# Patient Record
Sex: MALE | Race: WHITE | NOT HISPANIC OR LATINO | Employment: FULL TIME | URBAN - METROPOLITAN AREA
[De-identification: names, ages, dates, MRNs, and addresses within clinical notes are randomized per-mention and may not be internally consistent; named-entity substitution may affect disease eponyms.]

---

## 2018-01-05 ENCOUNTER — APPOINTMENT (EMERGENCY)
Dept: RADIOLOGY | Facility: HOSPITAL | Age: 48
End: 2018-01-05
Payer: OTHER MISCELLANEOUS

## 2018-01-05 ENCOUNTER — HOSPITAL ENCOUNTER (EMERGENCY)
Facility: HOSPITAL | Age: 48
Discharge: HOME | End: 2018-01-05
Attending: EMERGENCY MEDICINE
Payer: OTHER MISCELLANEOUS

## 2018-01-05 VITALS
HEIGHT: 66 IN | TEMPERATURE: 97.6 F | OXYGEN SATURATION: 99 % | BODY MASS INDEX: 24.11 KG/M2 | SYSTOLIC BLOOD PRESSURE: 122 MMHG | HEART RATE: 64 BPM | DIASTOLIC BLOOD PRESSURE: 70 MMHG | WEIGHT: 150 LBS | RESPIRATION RATE: 17 BRPM

## 2018-01-05 DIAGNOSIS — S63.602A SPRAIN OF LEFT THUMB: Primary | ICD-10-CM

## 2018-01-05 DIAGNOSIS — S50.02XA CONTUSION OF LEFT ELBOW: ICD-10-CM

## 2018-01-05 DIAGNOSIS — S43.101A AC SEPARATION, RIGHT, INITIAL ENCOUNTER: ICD-10-CM

## 2018-01-05 PROCEDURE — 73030 X-RAY EXAM OF SHOULDER: CPT

## 2018-01-05 PROCEDURE — 73080 X-RAY EXAM OF ELBOW: CPT

## 2018-01-05 PROCEDURE — 73140 X-RAY EXAM OF FINGER(S): CPT

## 2018-01-05 PROCEDURE — 99283 EMERGENCY DEPT VISIT LOW MDM: CPT

## 2018-01-05 RX ORDER — TRAMADOL HYDROCHLORIDE 50 MG/1
50 TABLET ORAL EVERY 6 HOURS PRN
Qty: 15 TABLET | Refills: 0 | Status: SHIPPED | OUTPATIENT
Start: 2018-01-05 | End: 2018-09-26

## 2018-01-05 RX ORDER — IBUPROFEN 600 MG/1
600 TABLET ORAL ONCE
Status: DISCONTINUED | OUTPATIENT
Start: 2018-01-05 | End: 2018-01-05

## 2018-01-05 NOTE — DISCHARGE INSTRUCTIONS
Rest, ice, elevate  Sling until seen by ortho  Call ortho on Monday for a follow up appointment  Motrin 600mg every 6 hours as needed for pain, ultram for severe pain  No heavy lifting  Acromioclavicular Separation   WHAT YOU NEED TO KNOW:   An acromioclavicular separation (AS), or shoulder separation, is when your shoulder and collarbone move or come apart  An AS is usually caused by an injury, such as falling on your shoulder  The bones move or come apart because the ligaments that hold the bones in place are stretched or torn  DISCHARGE INSTRUCTIONS:   Medicines: You may need any of the following:  · Acetaminophen  decreases pain and is available without a doctor's order  Ask how much to take and how often to take it  Follow directions  Acetaminophen can cause liver damage if not taken correctly  · NSAIDs  help decrease swelling and pain  This medicine is available with or without a doctor's order  NSAIDs can cause stomach bleeding or kidney problems in certain people  If you take blood thinner medicine, always ask your healthcare provider if NSAIDs are safe for you  Always read the medicine label and follow directions  · A Tetanus (Td) vaccine  may be needed if you have an open wound  This vaccine is a booster shot used to help prevent diphtheria and tetanus  · Take your medicine as directed  Contact your healthcare provider if you think your medicine is not helping or if you have side effects  Tell him if you are allergic to any medicine  Keep a list of the medicines, vitamins, and herbs you take  Include the amounts, and when and why you take them  Bring the list or the pill bottles to follow-up visits  Carry your medicine list with you in case of an emergency  Apply ice:  Apply ice on your shoulder for 15 to 20 minutes every hour for the first 1 to 2 days  Use an ice pack, or put crushed ice in a plastic bag  Cover it with a towel   Ice helps prevent tissue damage and decreases swelling and pain  Apply heat:  Apply heat on your shoulder for 20 to 30 minutes every 2 hours after the first 1 to 2 days  Heat helps decrease pain and muscle spasms  Wear your support device: You may need to wear a strap, elastic bandage, or sling  These devices keep your shoulder in the correct position so it can heal   · Wear the strap or sling constantly for 6 to 8 weeks, even when you sleep  You may remove the strap or sling when you bathe  Do not move your shoulder or arm when the strap or sling is off  Do not lift your arm  · The strap or sling must be tightened by another person every day  Tighten it enough to keep your shoulders back in the correct posture  Tell the person to allow enough room to fit an index finger between your body and the strap  Put a folded wash cloth in your armpit to prevent pressure on the nerves by the strap  Loosen the strap if you feel numbness or tingling in your arm or hand  Rest your shoulder:  Rest your shoulder as much as possible to decrease swelling and help it heal    Follow up with your healthcare provider as directed:  Write down your questions so you remember to ask them during your visits  Contact your healthcare provider if:   · You have a fever  · You have worse pain, even after you take medicine  · You have an open wound that is red, swollen, or draining pus  · Your arm or hand becomes numb or tingles  · You have questions or concerns about your condition or care  Return to the emergency department if:   · You lose feeling in your arm or hand  · You cannot move your arm or hand  © 2017 [address] [employer] Information is for End User's use only and may not be sold, redistributed or otherwise used for commercial purposes  All illustrations and images included in CareNotes® are the copyrighted property of A D [employer] M [employer]  or [employer]  The above information is an  only   It is not intended as medical advice for individual conditions or treatments  Talk to your doctor, nurse or pharmacist before following any medical regimen to see if it is safe and effective for you  Contusion in Adults   AMBULATORY CARE:   A contusion  is a bruise that appears on your skin after an injury  A bruise happens when small blood vessels tear but skin does not  When blood vessels tear, blood leaks into nearby tissue, such as soft tissue or muscle  Other signs and symptoms you may have with a contusion:   · Pain that increases when you touch the bruise, walk, or use the area around the bruise    · Swelling or a lump at the site of the bruise or near it    · Red, blue, or black skin that may change to green or yellow after a few days           · Stiffness or problems moving the bruised area of your body  Seek care immediately if:   · You have new trouble moving the injured area  · You have tingling or numbness in or near the injured area  · Your hand or foot below the bruise gets cold or turns pale  Contact your healthcare provider if:   · You find a new lump in the injured area  · Your symptoms do not improve with treatment after 4 to 5 days  · You have questions or concerns about your condition or care  Treatment for a contusion  may include any of the following:  · NSAIDs , such as ibuprofen, help decrease swelling, pain, and fever  This medicine is available with or without a doctor's order  NSAIDs can cause stomach bleeding or kidney problems in certain people  If you take blood thinner medicine, always ask your healthcare provider if NSAIDs are safe for you  Always read the medicine label and follow directions  · Prescription pain medicine  may be given  Do not wait until the pain is severe before you take your medicine  · Aspiration  is a procedure used to drain pooled blood in your muscle  This may help prevent increased pressure in the muscle      · Surgery  may be done to repair a tear in the muscle or relieve pressure in the muscle caused by swelling  Help a contusion heal:   · Rest the injured area  or use it less than usual  If you bruised your leg or foot, you may need crutches or a cane to help you walk  This will help you keep weight off your injured body part  · Apply ice  to decrease swelling and pain  Ice may also help prevent tissue damage  Use an ice pack, or put crushed ice in a plastic bag  Cover it with a towel and place it on your bruise for 15 to 20 minutes every hour or as directed  · Use compression  to support the area and decrease swelling  Wrap an elastic bandage around the area over the bruised muscle  Make sure the bandage is not too tight  You should be able to fit 1 finger between the bandage and your skin  · Elevate (raise) your injured body part  above the level of your heart to help decrease pain and swelling  Use pillows, blankets, or rolled towels to elevate the area as often as you can  · Do not drink alcohol  as directed  Alcohol may slow healing  · Do not stretch injured muscles  right after your injury  Ask your healthcare provider when and how you may safely stretch after your injury  Gentle stretches can help increase your flexibility  · Do not massage the area or put heating pads  on the bruise right after your injury  Heat and massage may slow healing  Your healthcare provider may tell you to apply heat after several days  At that time, heat will start to help the injury heal   Follow up with your healthcare provider as directed:  Write down your questions so you remember to ask them during your visits  © 2017 [address] [employer] Information is for End User's use only and may not be sold, redistributed or otherwise used for commercial purposes  All illustrations and images included in CareNotes® are the copyrighted property of [employer] , [employer]  or Jd Turner  The above information is an  only   It is not intended as medical advice for individual conditions or treatments  Talk to your doctor, nurse or pharmacist before following any medical regimen to see if it is safe and effective for you  Finger Sprain   AMBULATORY CARE:   A finger sprain  happens when ligaments in your finger or thumb are stretched or torn  Ligaments are the tough tissues that connect bones  Ligaments allow your hands to grasp and pinch  Common symptoms include the following:   · Bruising or changes in skin color    · Pain and stiffness     · Swelling and tenderness  Seek immediate care for the following symptoms:   · The skin on your injured finger looks bluish or pale (less color than normal)  · You have new weakness or numbness in your finger or thumb  · You have a splint that you cannot adjust and it feels too tight  Contact your healthcare provider if:   · You have new or increased swelling or pain in your finger  · You have new or increased stiffness when you move your injured finger  · You have questions or concerns about your injury or treatment  Treatment for a finger sprain  may include medicine to decrease pain  Do not wait until the pain is severe before taking your medicine  Care for a finger sprain:   · Rest  your finger for at least 48 hours  Do not do activities that cause pain  Return to normal activities as directed  · Apply ice  on your finger to help decrease pain and swelling  Put crushed ice in a plastic bag and cover it with a towel  Put the ice on your injured finger or thumb every hour for 15 to 20 minutes at a time  You may need to ice the area at least 4 to 8 times each day  Ice your finger for as many days as directed  · Elevate your finger  above the level of your heart as often as you can  This will help decrease swelling and pain  You can elevate your hand by resting it on a pillow  · Use a splint or compression as directed  Compression (tight hold) helps support your finger or thumb as it heals   Tape your injured finger to the finger beside it  Severe sprains may be treated with a splint  A splint prevents your finger from moving while it heals  Ask how long you must wear the splint or tape, and how to apply them  · Do exercises as directed  You may be given gentle exercises to begin in a few days  Exercises can help decrease stiffness in your finger or thumb  Exercises also help decrease pain and swelling and improve the movement of your finger or thumb  Check with your healthcare provider before you return to your normal activities or sports  Follow up with your healthcare provider as directed:  Write down your questions so you remember to ask them during your visits  © 2017 [address] [facility] Information is for End User's use only and may not be sold, redistributed or otherwise used for commercial purposes  All illustrations and images included in CareNotes® are the copyrighted property of A D A M [employer] Inc  or [employer] Turner  The above information is an  only  It is not intended as medical advice for individual conditions or treatments  Talk to your doctor, nurse or pharmacist before following any medical regimen to see if it is safe and effective for you

## 2018-01-05 NOTE — ED PROVIDER NOTES
History  Chief Complaint   Patient presents with    Fall     patient was at work and slipped and fell on ice  C/o left elbow pain and swelling, left thumb pain with limited ROM of hand d/t pain  and right shoulder pain      Patient presents to the ED with left elbow, left thumb, and right shoulder pain after slipping on ice 2 5 hours ago at work while carrying logs  Patient did not take anything for the pain  He denies hitting head or LOC  He denies any other injuries  Patient states he has a history of right shoulder strain and when he went to grab something at work he noticed the pain  History provided by:  Patient  Fall   Mechanism of injury: fall    Injury location:  Shoulder/arm  Shoulder/arm injury location:  L elbow, L fingers and R shoulder  Incident location:  Work  Time since incident:  3 hours  Arrived directly from scene: no    Fall:     Fall occurred: slipped on ice while carrying logs  Impact surface:  Hard floor    Point of impact: left arm  Entrapped after fall: no    Protective equipment: none    Suspicion of alcohol use: no    Suspicion of drug use: no    Tetanus status:  Unknown  Prior to arrival data:     Bystander interventions:  None    Patient ambulatory at scene: yes      Blood loss:  None    Responsiveness at scene:  Alert    Orientation at scene:  Person, place and situation    Loss of consciousness: no      Amnesic to event: no      Immobilization:  None  Associated symptoms: no abdominal pain, no back pain, no blindness, no chest pain, no difficulty breathing, no headaches, no hearing loss, no loss of consciousness, no nausea, no neck pain, no seizures and no vomiting        None       Past Medical History:   Diagnosis Date    Asthma        History reviewed  No pertinent surgical history  History reviewed  No pertinent family history  I have reviewed and agree with the history as documented      Social History   Substance Use Topics    Smoking status: Never Smoker    Smokeless tobacco: Never Used    Alcohol use Yes        Review of Systems   Constitutional: Negative for chills and fever  HENT: Negative for hearing loss  Eyes: Negative for blindness  Cardiovascular: Negative for chest pain  Gastrointestinal: Negative for abdominal pain, nausea and vomiting  Musculoskeletal: Negative for back pain and neck pain  Right shoulder, left elbow, left thumb pain  Neurological: Negative for dizziness, seizures, loss of consciousness, weakness, numbness and headaches  Psychiatric/Behavioral: Negative for confusion  All other systems reviewed and are negative  Physical Exam  ED Triage Vitals [01/05/18 1650]   Temperature Pulse Respirations Blood Pressure SpO2   97 6 °F (36 4 °C) 64 17 122/70 99 %      Temp Source Heart Rate Source Patient Position - Orthostatic VS BP Location FiO2 (%)   Temporal Monitor Sitting Right arm --      Pain Score       7           Orthostatic Vital Signs  Vitals:    01/05/18 1650   BP: 122/70   Pulse: 64   Patient Position - Orthostatic VS: Sitting       Physical Exam   Constitutional: He is oriented to person, place, and time  He appears well-developed and well-nourished  He is active and cooperative  He does not appear ill  No distress  HENT:   Head: Normocephalic and atraumatic  Right Ear: Hearing normal    Left Ear: Hearing normal    Nose: Nose normal    Mouth/Throat: Oropharynx is clear and moist    Eyes: Conjunctivae are normal  Right pupil is round  Left pupil is round  Neck: Normal range of motion  No spinous process tenderness and no muscular tenderness present  Cardiovascular: Normal rate, regular rhythm and normal heart sounds  No murmur heard  Pulses:       Radial pulses are 2+ on the right side, and 2+ on the left side  Pulmonary/Chest: Effort normal and breath sounds normal  He has no wheezes  He has no rhonchi  He has no rales  Musculoskeletal:        Right shoulder: He exhibits tenderness  He exhibits normal range of motion, no bony tenderness, no swelling, no deformity, no laceration and normal pulse  Left elbow: He exhibits normal range of motion, no swelling, no effusion, no deformity and no laceration  Tenderness found  Olecranon process tenderness noted  No radial head tenderness noted  Left hand: He exhibits tenderness and bony tenderness  He exhibits no deformity, no laceration and no swelling  Normal sensation noted  Normal strength noted  Tenderness to base of thumb, no snuff box tenderness  Swelling to thenar eminence  Neurological: He is alert and oriented to person, place, and time  He has normal strength  No sensory deficit  Gait normal    Skin: Skin is warm and dry  No abrasion, no bruising, no ecchymosis and no rash noted  He is not diaphoretic  No erythema  No pallor  Psychiatric: He has a normal mood and affect  Nursing note and vitals reviewed  ED Medications  Medications - No data to display    Diagnostic Studies  Results Reviewed     None                 XR thumb first digit-min 2 views LEFT   Final Result by Aissatou Washington MD (01/05 1724)      No fracture seen         Workstation performed: [id number]         XR elbow 3+ views LEFT   Final Result by Aissatou Washington MD (01/05 1722)      No acute osseous abnormality  Workstation performed: [id number]         XR shoulder 2+ views RIGHT   Final Result by Aissatou Washington MD (01/05 1721)      Questionable acromioclavicular joint injury with slight malalignment suggested on the transscapular Y view           Workstation performed: [id number]                    Procedures  Procedures       Phone Contacts  ED Phone Contact    ED Course  ED Course                                MDM  Number of Diagnoses or Management Options  AC separation, right, initial encounter: new and requires workup  Contusion of left elbow: new and requires workup  Sprain of left thumb: new and requires workup  Diagnosis management comments: Will apply sling to right arm  Patient instructed to f/u with ortho for possible AC separation and further imaging  Amount and/or Complexity of Data Reviewed  Tests in the radiology section of CPT®: ordered and reviewed    Patient Progress  Patient progress: stable    CritCare Time    Disposition  Final diagnoses:   Sprain of left thumb   Contusion of left elbow   AC separation, right, initial encounter     Time reflects when diagnosis was documented in both MDM as applicable and the Disposition within this note     Time User Action Codes Description Comment    1/5/2018  5:33 PM Dennis Lao Sprain of left thumb     1/5/2018  5:33 PM Berna Martínez Add [S50 02XA] Contusion of left elbow     1/5/2018  5:34 PM Berna Martínez Add [S43 101A] AC separation, right, initial encounter       ED Disposition     ED Disposition Condition Comment    Discharge  Talya Shi discharge to home/self care  Condition at discharge: Stable        Follow-up Information     Follow up With Specialties Details Why [address] [facility] Call in 2 days For recheck [address] [id number] [phone]        Discharge Medication List as of 1/5/2018  5:40 PM      START taking these medications    Details   traMADol (ULTRAM) 50 mg tablet Take 1 tablet by mouth every 6 (six) hours as needed for moderate pain, Starting Fri 1/5/2018, Print           No discharge procedures on file      ED Provider  Electronically Signed by           Mali Bassett PA-C  01/05/18 0784

## 2018-01-11 ENCOUNTER — ALLSCRIPTS OFFICE VISIT (OUTPATIENT)
Dept: OTHER | Facility: OTHER | Age: 48
End: 2018-01-11

## 2018-01-13 NOTE — PROGRESS NOTES
Assessment   1  Separation of right acromioclavicular joint, type 1, initial encounter (831 04) (S43 101A)    Plan   Separation of right acromioclavicular joint, type 1, initial encounter    · Follow-up Visit in 4 Weeks Evaluation and Treatment  Follow-up  Status: Hold For -    Scheduling  Requested for: [id number]    Discussion/Summary      Findings consistent with right shoulder acromioclavicular separation type 1  Discussed findings and treatment options with the patient  Discussed prognosis of patient injury  I provided patient a work note to limit use of his right arm  I would like to see patient back in 3-4 weeks for re-evaluation  All patient's questions were answered to his satisfaction  This note is created using dictation transcription  It may contains topographical errors, grammatical errors, improperly dictated words, background noise and other errors  Chief Complaint   Right shoulder pain      History of Present Illness   61-year-old gentleman who injured his right shoulder when he slipped on ice while carrying firewood at work on 1/5/2018  He landed mostly on the right side  He started experiencing discomfort over his right shoulder  Patient did have history of right shoulder separation in the past but has not had any issue until his fall  He is complaining of mild pain over the top of his right shoulder  He denies weakness or pain from his neck  Information on patient's intake form was reviewed  Review of Systems        Constitutional: No fever or chills, feels well, no tiredness, no recent weight loss or weight gain  Eyes: No complaints of red eyes, no eyesight problems  ENT: no complaints of loss of hearing, no nosebleeds, no sore throat  Cardiovascular: No complaints of chest pain, no palpitations, no leg claudication or lower extremity edema  Respiratory: No complaints of shortness of breath, no wheezing, no cough        Gastrointestinal: No complaints of abdominal pain, no constipation, no nausea or vomiting, no diarrhea or bloody stools  Genitourinary: No complaints of dysuria or incontinence, no hesitancy, no nocturia  Musculoskeletal: as noted in HPI  Integumentary: No complaints of skin rash or lesion, no itching or dry skin, no skin wounds  Neurological: No complaints of headache, no confusion, no numbness or tingling, no dizziness  Psychiatric: No suicidal thoughts, no anxiety, no depression  Endocrine: No muscle weakness, no frequent urination, no excessive thirst, no feelings of weakness  ROS reviewed  Past Medical History      The active problems and past medical history were reviewed and updated today  Surgical History      The surgical history was reviewed and updated today  Family History   Mother    · Family history of Healthy adult  Father    · Family history of Healthy adult     The family history was reviewed and updated today  Social History    · Never a smoker  The social history was reviewed and updated today  Current Meds    1  ProAir  (90 Base) MCG/ACT Inhalation Aerosol Solution; Therapy: (Recorded:11Jan2018) to Recorded   2  Ultram 50 MG Oral Tablet; Therapy: (Recorded:11Jan2018) to Recorded     The medication list was reviewed and updated today  Allergies   1   No Known Drug Allergies    Vitals    Recorded: [id number] 03:40PM   Heart Rate 87   Systolic 239   Diastolic 70   Height 5 ft 6 in   Weight 156 lb    BMI Calculated 25 18   BSA Calculated 1 8     Physical Exam        Constitutional - General appearance: Normal       Musculoskeletal - Gait and station: Normal       Cardiovascular - Examination of extremities for edema and/or varicosities: Normal       Skin - Skin and subcutaneous tissue: Normal       Neurologic - Sensation: Normal -- Upper extremity peripheral neuro exam: Normal       Psychiatric - Orientation to person, place, and time: Normal -- Mood and affect: Normal       Eyes      Conjunctiva and lids: Normal      Right Shoulder: Appearance: no AC joint hypertrophy,-- no AC joint step-off,-- no deltoid atrophy,-- no trapezius atrophy,-- no belly of the biceps lorena deformity,-- no ecchymosis-- and-- no shoulder swelling  Tenderness: AC joint, but-- not the bicipital groove,-- not the coracoid,-- not the sternoclavicular joint-- and-- not the greater tuberosity  ROM: equivalent both sides  Motor: Normal  Forward flexion: painful  Abduction: painful  Adduction: painful  Special Tests: positive Painful Arc-- and-- positive Cross Body Adduction test, but-- negative Arnett test,-- negative Neer test,-- negative Drop Arm test,-- negative Empty Can test,-- negative Lift Off test,-- negative Sulcus sign-- and-- negative Speed's test       Results/Data   I personally reviewed the films/images/results in the office today  My interpretation follows  X-ray Review Right shoulder showed mild osteoarthritis over the acromioclavicular joint  Type 2 acromion process  No soft tissue calcification  Acromioclavicular joint still appeared to be well aligned        Signatures    Electronically signed by : Aida Salmon MD; Jan 11 2018  3:55PM EST                       (Author)

## 2018-01-23 VITALS
SYSTOLIC BLOOD PRESSURE: 118 MMHG | HEIGHT: 66 IN | DIASTOLIC BLOOD PRESSURE: 70 MMHG | BODY MASS INDEX: 25.07 KG/M2 | HEART RATE: 87 BPM | WEIGHT: 156 LBS

## 2018-02-13 VITALS
DIASTOLIC BLOOD PRESSURE: 72 MMHG | WEIGHT: 157 LBS | SYSTOLIC BLOOD PRESSURE: 116 MMHG | BODY MASS INDEX: 25.23 KG/M2 | HEART RATE: 78 BPM | HEIGHT: 66 IN

## 2018-02-13 DIAGNOSIS — S43.101D: Primary | ICD-10-CM

## 2018-02-13 PROCEDURE — 99213 OFFICE O/P EST LOW 20 MIN: CPT | Performed by: PHYSICIAN ASSISTANT

## 2018-02-13 RX ORDER — IBUPROFEN 200 MG
TABLET ORAL EVERY 6 HOURS PRN
COMMUNITY
End: 2021-04-28

## 2018-02-13 NOTE — PROGRESS NOTES
Assessment:     1  Acromioclavicular separation, type 1, right, subsequent encounter        Plan:     Problem List Items Addressed This Visit        Musculoskeletal and Integument    Acromioclavicular separation, type 1, right, subsequent encounter - Primary     Findings and treatment options were discussed with the patient  He is doing well  Recommend formal physical therapy at this time  Continue work restrictions  No lifting greater than 20 lb and no overhead activities  Follow-up in 4 weeks with Dr Chao Diaz for re-evaluation  Relevant Orders    Ambulatory referral to Physical Therapy         Subjective:     Patient ID: Hesham Anna is a 41 y o  male  Chief Complaint:  Follow-up right shoulder injury  HPI   This is a 65-year-old white male who is following up for a right AC joint grade 1 separation  He suffered a work related injury on January 5, 2018 when he slipped on ice while carrying firewood  Overall his pain has improved  He continues to feel a dull, aching and sometimes burning pain in his right shoulder  He denies any limitations with range of motion or strength  He is working with restrictions  Allergy:  No Known Allergies     Medications:  all current active meds have been reviewed     Past Medical History:  Past Medical History:   Diagnosis Date    Asthma      Past Surgical History:  History reviewed  No pertinent surgical history  Family History:  Family History   Problem Relation Age of Onset    No Known Problems Mother     No Known Problems Father      Social History:  History   Alcohol Use    Yes     History   Drug Use No     History   Smoking Status    Never Smoker   Smokeless Tobacco    Never Used     Review of Systems   Constitutional: Negative  HENT: Negative  Eyes: Negative  Respiratory: Negative  Cardiovascular: Negative  Gastrointestinal: Negative  Endocrine: Negative  Genitourinary: Negative  Musculoskeletal: Positive for arthralgias     Skin: Negative  Neurological: Negative  Hematological: Negative  Psychiatric/Behavioral: Negative  Objective:  BP Readings from Last 1 Encounters:   02/13/18 116/72      Wt Readings from Last 1 Encounters:   02/13/18 71 2 kg (157 lb)      BMI:   Estimated body mass index is 25 34 kg/m² as calculated from the following:    Height as of this encounter: 5' 6" (1 676 m)  Weight as of this encounter: 71 2 kg (157 lb)  BSA:   Estimated body surface area is 1 8 meters squared as calculated from the following:    Height as of this encounter: 5' 6" (1 676 m)  Weight as of this encounter: 71 2 kg (157 lb)  Physical Exam   Constitutional: He is oriented to person, place, and time  He appears well-developed  HENT:   Head: Normocephalic and atraumatic  Eyes: EOM are normal  Pupils are equal, round, and reactive to light  Neck: Neck supple  Musculoskeletal: He exhibits tenderness  Neurological: He is alert and oriented to person, place, and time  Skin: Skin is warm  Psychiatric: He has a normal mood and affect  Nursing note and vitals reviewed  Right Shoulder Exam     Tenderness   The patient is experiencing tenderness in the acromioclavicular joint (Minimal)  Range of Motion   The patient has normal right shoulder ROM      Muscle Strength   Abduction: 5/5   Internal Rotation: 5/5   External Rotation: 5/5   Biceps: 5/5     Tests   Cross Arm: positive  Drop Arm: negative  Hawkin's test: negative  Impingement: negative    Other   Sensation: normal  Pulse: present    Comments:  Mild pain with resisted abduction  Positive empty can  Negative speed's      Left Shoulder Exam   Left shoulder exam is normal

## 2018-02-26 ENCOUNTER — EVALUATION (OUTPATIENT)
Dept: PHYSICAL THERAPY | Facility: CLINIC | Age: 48
End: 2018-02-26
Payer: OTHER MISCELLANEOUS

## 2018-02-26 DIAGNOSIS — S43.101D: Primary | ICD-10-CM

## 2018-02-26 PROCEDURE — 97110 THERAPEUTIC EXERCISES: CPT | Performed by: PHYSICAL THERAPIST

## 2018-02-26 PROCEDURE — 97161 PT EVAL LOW COMPLEX 20 MIN: CPT | Performed by: PHYSICAL THERAPIST

## 2018-02-26 PROCEDURE — G8990 OTHER PT/OT CURRENT STATUS: HCPCS | Performed by: PHYSICAL THERAPIST

## 2018-02-26 PROCEDURE — G8991 OTHER PT/OT GOAL STATUS: HCPCS | Performed by: PHYSICAL THERAPIST

## 2018-02-26 NOTE — PROGRESS NOTES
PT Evaluation     Today's date: 2018  Patient name: Rob Peterson  : 1970  MRN: [id number]  Referring provider: Michael Henley PA-C  Dx:   Encounter Diagnosis     ICD-10-CM    1  Acromioclavicular separation, type 1, right, subsequent encounter S43 101D Ambulatory referral to Physical Therapy                  Assessment  Impairments: abnormal or restricted ROM, impaired physical strength, lacks appropriate home exercise program and pain with function    Assessment details: Pt is a 41y o  year old male coming to outpatient PT with a diagnosis of R AC joint separation grade I s/p fall on 18  Pt presents with increased pain, decreased ROM, decreased strength and overall decreased functional mobility  Pt would benefit from skilled PT services in order to address these deficits and reach maximum level of function  Thank you kindly for the referral!  Understanding of Dx/Px/POC: good   Prognosis: good    Goals  STG's ( 3-4 weeks)  1  Pt will be independent in HEP  2  Improve R shoulder ROM by 10*-15*   LTG's ( 6- 8 weeks)  1  Improve FOTO score by 8-10 points  2  Improve R shoulder strength by 1/2 grade  3  Improve R shoulder ROM to WFL's     Plan  Patient would benefit from: PT eval and skilled PT  Planned modality interventions: cryotherapy and TENS  Planned therapy interventions: manual therapy, neuromuscular re-education, therapeutic exercise, stretching and home exercise program  Frequency: 2x week  Duration in weeks: 8  Treatment plan discussed with: patient        Subjective Evaluation    History of Present Illness  Date of onset: 2018  Mechanism of injury: Pt was walking across the parking lot with an arm full of logs  Pt slipped on ice and fell onto his back  Initially, pt had L thumb pain, but when he went into his car to put on his seatbelt, his R shoulder was painful  X-ray testing showed R AC joint separation  Pt was immobilized in a sling for several days   Pt continues to have increased pain in his R shoulder  Pt has increased R shoulder pain when reaching out to the side  Pt has minimal pain with sleeping supine  Pt reports his R shoulder sags when at rest with burning pain  Pt has increased pain when pushing straight in front of himself  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[employer]; working modified duty  Hobbies: plays guitar; recreational exercise  Goals: to return to work full duty, return to playing guitar, return to exercise  Gait: no abnormalities  Not a recurrent problem   Pain  At best pain ratin  At worst pain ratin  Location: R shoulder  Quality: burning and dull ache  Relieving factors: medications and ice  Aggravating factors: lifting    Social Support  Steps to enter house: yes  39  Stairs in house: yes   12  Lives in: apartment    Employment status: working  Hand dominance: right  Exercise comments: exercises regularly at the gym        Diagnostic Tests  X-ray: abnormal  Treatments  Previous treatment: medication  Current treatment: physical therapy  Patient Goals  Patient goals for therapy: return to sport/leisure activities  Patient goal: return to exercise at the gym        Objective     Neurological Testing     Sensation     Shoulder   Left Shoulder   Intact: light touch    Right Shoulder   Intact: light touch    Reflexes   Left   Biceps (C5/C6): normal (2+)  Brachioradialis (C6): normal (2+)  Triceps (C7): normal (2+)    Right   Biceps (C5/C6): normal (2+)  Brachioradialis (C6): normal (2+)  Triceps (C7): normal (2+)    Active Range of Motion   Left Shoulder   Normal active range of motion    Right Shoulder   Flexion: 145 degrees with pain  Abduction: 95 degrees with pain  External rotation 45°: 58 degrees with pain  Internal rotation 45°: 75 degrees with pain    Additional Active Range of Motion Details  Cervical AROM: WFL's  Elbow ROM and strength: WFL's  (+) TTP R shoulder biceps tendon insertion and posterior shoulder  (+) Speed's test  (+) supraspinatus test  (+) Keith Kamara with IR with a " crack"  (+) impingement sign; (+) AC joint compression    Passive Range of Motion   Left Shoulder   Normal passive range of motion    Right Shoulder   Flexion: 120 degrees with pain  Abduction: 90 degrees with pain  External rotation 45°: 60 degrees with pain  Internal rotation 45°: 75 degrees with pain    Strength/Myotome Testing     Left Shoulder   Normal muscle strength    Right Shoulder     Planes of Motion   Flexion: 4 (pain)   External rotation at 0°: 4 (pain)   Internal rotation at 0°: 4+ (pain)         Dx: R shoulder AC joint separation, grade I  EPOC: 4/23/18  Precautions: RTC and labral dysfunction  CO-MORBIDITES: asthma  PERSONAL FACTORS: wants to return to exercise, guitar playing, and full duty in a warehouse at work    Manual  2/26            PROM R shoulder              TrP release biceps tendon, teres                                                        Exercise Diary  2/26            Pendulums instruct            Shoulder isometrics: all planes instruct            UT stretch             Pulleys: flex             Pulleys: scap                          Scapular Retraction             Table Slides                                                                                                                                                                             Modalities  2/26            TENS w/ cp             K tape V down th            K tape biceps inhibition th                  

## 2018-02-28 ENCOUNTER — OFFICE VISIT (OUTPATIENT)
Dept: PHYSICAL THERAPY | Facility: CLINIC | Age: 48
End: 2018-02-28
Payer: OTHER MISCELLANEOUS

## 2018-02-28 DIAGNOSIS — S43.101D: Primary | ICD-10-CM

## 2018-02-28 PROCEDURE — 97110 THERAPEUTIC EXERCISES: CPT

## 2018-02-28 PROCEDURE — 97140 MANUAL THERAPY 1/> REGIONS: CPT

## 2018-02-28 NOTE — PROGRESS NOTES
Daily Note     Today's date: 2018  Patient name: Padma Fernandes  : 1970  MRN: [id number]  Referring provider: Koko Potter PA-C  Dx:   Encounter Diagnosis     ICD-10-CM    1  Acromioclavicular separation, type 1, right, subsequent encounter S43 101D                   Subjective: Pt reports having a dull burning irritating pain 2/10  Objective: See treatment diary below      Assessment: Pt able to david ex's given  Reports the shld feels good post manuals  Needs cont'd work on strenghtening, pain relief and   AROM      Plan: Continue per plan of care  Progress treatment as tolerated      Dx: R shoulder AC joint separation, grade I  EPOC: 18  Precautions: RTC and labral dysfunction  CO-MORBIDITES: asthma  PERSONAL FACTORS: wants to return to exercise, guitar playing, and full duty in a warehouse at work    Manual             PROM R shoulder  JK            TrP release biceps tendon, teres  JK                                                      Exercise Diary             Pendulums instruct hep           Shoulder isometrics: all planes instruct 10x10"           UT stretch  3x20"           Pulleys: flex  3'           Pulleys: scap  3'                        Scapular Retraction  10x5"           Table Slides  Flex  Scap, ER  [id number]"                                                                                                                                                                           Modalities              TENS w/ cp             K tape V down th            K tape biceps inhibition th

## 2018-03-05 ENCOUNTER — APPOINTMENT (OUTPATIENT)
Dept: PHYSICAL THERAPY | Facility: CLINIC | Age: 48
End: 2018-03-05
Payer: OTHER MISCELLANEOUS

## 2018-03-07 ENCOUNTER — APPOINTMENT (OUTPATIENT)
Dept: PHYSICAL THERAPY | Facility: CLINIC | Age: 48
End: 2018-03-07
Payer: OTHER MISCELLANEOUS

## 2018-03-12 ENCOUNTER — OFFICE VISIT (OUTPATIENT)
Dept: PHYSICAL THERAPY | Facility: CLINIC | Age: 48
End: 2018-03-12
Payer: OTHER MISCELLANEOUS

## 2018-03-12 DIAGNOSIS — S43.101D: Primary | ICD-10-CM

## 2018-03-12 PROCEDURE — 97010 HOT OR COLD PACKS THERAPY: CPT

## 2018-03-12 PROCEDURE — 97110 THERAPEUTIC EXERCISES: CPT

## 2018-03-12 PROCEDURE — 97140 MANUAL THERAPY 1/> REGIONS: CPT

## 2018-03-12 NOTE — PROGRESS NOTES
Daily Note     Today's date: 3/12/2018  Patient name: Drew Dempsey  : 1970  MRN: [id number]  Referring provider: Steve Hunter PA-C  Dx:   Encounter Diagnosis     ICD-10-CM    1  Acromioclavicular separation, type 1, right, subsequent encounter S43 101D                   Subjective: Pt reports his shld felt really good the next day following PT   States the last couple of day he has had more shld pain he relates to driving    pain levels  A dull ache  Objective: See treatment diary below      Assessment: Tolerated treatment fair  Patient w/ a palpable tightness in the post and ant shld  Good response to MFR  Plan: Continue per plan of care  Progress treatment as tolerated      Dx: R shoulder AC joint separation, grade I  EPOC: 18  Precautions: RTC and labral dysfunction  CO-MORBIDITES: asthma  PERSONAL FACTORS: wants to return to exercise, guitar playing, and full duty in a warehouse at work    Manual  2/26 2/28 3/12          PROM R shoulder  JK JK           TrP release biceps tendon, teres  JK JK                                                     Exercise Diary  2/26 2/28 3/12          Pendulums instruct hep           Shoulder isometrics: all planes instruct 10x10" 10x10"          UT stretch  3x20" 3x20"          Pulleys: flex  3' 3'          Pulleys: scap  3' 3'                       Scapular Retraction  10x5" 10x5"          Table Slides  Flex  Scap, ER  [id number]" All dir  10x5"          AAROM flex   10                                                                                                                                                             Modalities  2/26 2/28 3/12          TENS w/ cp  CP CP          K tape V down th            K tape biceps inhibition th

## 2018-03-14 ENCOUNTER — OFFICE VISIT (OUTPATIENT)
Dept: PHYSICAL THERAPY | Facility: CLINIC | Age: 48
End: 2018-03-14
Payer: OTHER MISCELLANEOUS

## 2018-03-14 DIAGNOSIS — S43.101D: Primary | ICD-10-CM

## 2018-03-14 PROCEDURE — G8990 OTHER PT/OT CURRENT STATUS: HCPCS | Performed by: PHYSICAL THERAPIST

## 2018-03-14 PROCEDURE — 97140 MANUAL THERAPY 1/> REGIONS: CPT

## 2018-03-14 PROCEDURE — G8991 OTHER PT/OT GOAL STATUS: HCPCS | Performed by: PHYSICAL THERAPIST

## 2018-03-14 PROCEDURE — 97110 THERAPEUTIC EXERCISES: CPT

## 2018-03-14 PROCEDURE — 97010 HOT OR COLD PACKS THERAPY: CPT

## 2018-03-14 NOTE — PROGRESS NOTES
Daily Note     Today's date: 3/14/2018  Patient name: Lexy Flores  : 1970  MRN: [id number]  Referring provider: King Maravilla PA-C  Dx:   Encounter Diagnosis     ICD-10-CM    1  Acromioclavicular separation, type 1, right, subsequent encounter S43 101D                   Subjective: Pt reports he feels great post PT like a runner's high and the next day he get shld pain  Objective: See treatment diary below      Assessment: Pt able to david an increase in ex's  Pt w/ better mobility w/ getting on the table and taking sweatshirt off  Plan: Continue per plan of care  Progress treatment as tolerated      Dx: R shoulder AC joint separation, grade I  EPOC: 18  Precautions: RTC and labral dysfunction  CO-MORBIDITES: asthma  PERSONAL FACTORS: wants to return to exercise, guitar playing, and full duty in a warehouse at work    Manual  2/26 2/28 3/12 3/14         PROM R shoulder  JK JK JK          TrP release biceps tendon, teres  JK JK JK                                                    Exercise Diary  2/26 2/28 3/12 3/14         Pendulums instruct hep           Shoulder isometrics: all planes instruct 10x10" 10x10" 10x  10"         UT stretch  3x20" 3x20" hep         Pulleys: flex  3' 3' 3'         Pulleys: scap  3' 3' 3'                      Scapular Retraction  10x5" 10x5" 20x5"         Table Slides  Flex  Scap, ER  [id number]" All dir  10x5" All dir  10x5"         AAROM flex   10 10         AARom ER    10         Prone ext    10         Prone row    10                                                                                                                     Modalities  2/26 2/28 3/12 3/14         TENS w/ cp  CP CP CP  10'         K tape V down th            K tape biceps inhibition th

## 2018-03-19 ENCOUNTER — APPOINTMENT (OUTPATIENT)
Dept: PHYSICAL THERAPY | Facility: CLINIC | Age: 48
End: 2018-03-19
Payer: OTHER MISCELLANEOUS

## 2018-03-21 ENCOUNTER — APPOINTMENT (OUTPATIENT)
Dept: PHYSICAL THERAPY | Facility: CLINIC | Age: 48
End: 2018-03-21
Payer: OTHER MISCELLANEOUS

## 2018-03-22 ENCOUNTER — APPOINTMENT (OUTPATIENT)
Dept: PHYSICAL THERAPY | Facility: CLINIC | Age: 48
End: 2018-03-22
Payer: OTHER MISCELLANEOUS

## 2018-03-26 ENCOUNTER — APPOINTMENT (OUTPATIENT)
Dept: PHYSICAL THERAPY | Facility: CLINIC | Age: 48
End: 2018-03-26
Payer: OTHER MISCELLANEOUS

## 2018-03-28 ENCOUNTER — APPOINTMENT (OUTPATIENT)
Dept: PHYSICAL THERAPY | Facility: CLINIC | Age: 48
End: 2018-03-28
Payer: OTHER MISCELLANEOUS

## 2018-04-02 ENCOUNTER — EVALUATION (OUTPATIENT)
Dept: PHYSICAL THERAPY | Facility: CLINIC | Age: 48
End: 2018-04-02
Payer: OTHER MISCELLANEOUS

## 2018-04-02 DIAGNOSIS — S43.101D: Primary | ICD-10-CM

## 2018-04-02 PROCEDURE — 97140 MANUAL THERAPY 1/> REGIONS: CPT | Performed by: PHYSICAL THERAPIST

## 2018-04-02 PROCEDURE — G8990 OTHER PT/OT CURRENT STATUS: HCPCS | Performed by: PHYSICAL THERAPIST

## 2018-04-02 PROCEDURE — 97110 THERAPEUTIC EXERCISES: CPT | Performed by: PHYSICAL THERAPIST

## 2018-04-02 PROCEDURE — G8991 OTHER PT/OT GOAL STATUS: HCPCS | Performed by: PHYSICAL THERAPIST

## 2018-04-02 NOTE — PROGRESS NOTES
PT Re-Evaluation     Today's date: 2018  Patient name: Trenton Melara  : 1970  MRN: [id number]  Referring provider: Winter Mota PA-C  Dx:   Encounter Diagnosis     ICD-10-CM    1  Acromioclavicular separation, type 1, right, subsequent encounter S43 101D                   Assessment  Impairments: abnormal or restricted ROM, impaired physical strength, lacks appropriate home exercise program and pain with function    Assessment details: Since starting skilled physical therapy, R shoulder ROM and strength are improving with gradual functional progress  Recommend pt continue skilled PT  Understanding of Dx/Px/POC: good   Prognosis: good    Goals  STG's ( 3-4 weeks)  1  Pt will be independent in HEP -met  2  Improve R shoulder ROM by 10*-15* -met  LTG's ( 6- 8 weeks)  1  Improve FOTO score by 8-10 points -met  2  Improve R shoulder strength by 1/2 grade -partial met  3  Improve R shoulder ROM to WFL's     Plan  Patient would benefit from: skilled PT  Planned modality interventions: cryotherapy and TENS  Planned therapy interventions: manual therapy, neuromuscular re-education, therapeutic exercise, stretching and home exercise program  Frequency: 2x week  Duration in weeks: 8        Subjective Evaluation    History of Present Illness  Date of onset: 2018  Mechanism of injury: Pt reports some relief after manual therapy in skilled physical therapy, but his shoulder feels about the same in general  Pt feels a dull burning pain when driving to and from work for one hour    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[employer]; working modified duty  Hobbies: plays [employer]; recreational exercise  Goals: to return to work full duty, return to playing guitar, return to exercise  Gait: no abnormalities  Not a recurrent problem   Pain  At best pain ratin  At worst pain ratin  Location: R shoulder  Quality: burning and dull ache  Relieving factors: medications and ice  Aggravating factors: lifting    Social Support  Steps to enter house: yes  39  Stairs in house: yes   12  Lives in: apartment    Employment status: working  Hand dominance: right  Exercise comments: exercises regularly at the gym        Diagnostic Tests  X-ray: abnormal  Treatments  Previous treatment: medication  Current treatment: physical therapy  Patient Goals  Patient goals for therapy: return to sport/leisure activities  Patient goal: return to exercise at the gym        Objective     Neurological Testing     Sensation     Shoulder   Left Shoulder   Intact: light touch    Right Shoulder   Intact: light touch    Reflexes   Left   Biceps (C5/C6): normal (2+)  Brachioradialis (C6): normal (2+)  Triceps (C7): normal (2+)    Right   Biceps (C5/C6): normal (2+)  Brachioradialis (C6): normal (2+)  Triceps (C7): normal (2+)    Active Range of Motion   Left Shoulder   Normal active range of motion    Right Shoulder   Flexion: 150 degrees with pain  Abduction: 158 degrees with pain  External rotation 45°: WFL and with pain  Internal rotation 45°: WFL and with pain    Additional Active Range of Motion Details  Cervical AROM: WFL's  Elbow ROM and strength: WFL's  (+) TTP R shoulder biceps tendon insertion and posterior shoulder  (+) Speed's test  (+) supraspinatus test  (+) Ramon Land with IR with a " crack"  (+) impingement sign; (+) AC joint compression    Passive Range of Motion   Left Shoulder   Normal passive range of motion    Right Shoulder   Flexion: 138 degrees with pain  Abduction: WFL and with pain  External rotation 45°: WFL and with pain  Internal rotation 45°: WFL and with pain    Strength/Myotome Testing     Left Shoulder   Normal muscle strength    Right Shoulder     Planes of Motion   Flexion: 4+ (pain)   Abduction: 4+   External rotation at 0°: 4+ (pain)   Internal rotation at 0°: 4+ (pain)         Dx: R shoulder AC joint separation, grade I  EPOC: 4/23/18  Precautions: RTC and labral dysfunction  CO-MORBIDITES: asthma  PERSONAL FACTORS: wants to return to exercise, guitar playing, and full duty in a [employer] at work    Manual  2/26 2/28 3/12 3/14 4/2        PROM R shoulder  JK JK JK th         TrP release biceps tendon, teres  JK JK JK                                                    Exercise Diary  2/26 2/28 3/12 3/14 4/2        Pendulums instruct hep           Shoulder isometrics: all planes instruct 10x10" 10x10" 10x  10" 10        UT stretch  3x20" 3x20" hep         Pulleys: flex  3' 3' 3' 3'        Pulleys: scap  3' 3' 3' 3'                     Scapular Retraction  10x5" 10x5" 20x5"         Table Slides  Flex  Scap, ER  [id number]" All dir  10x5" All dir  10x5"         AAROM flex   10 10         AARom ER    10         Prone ext    10         Prone row    10         S/l shoulder ER AROM     10        Tb LPD     Ox10        Tb row     Ox10        TB IR     Ox10        B ER     Ox10                                                   Modalities  2/26 2/28 3/12 3/14 4/2        TENS w/ cp  CP CP CP  10' -        K tape V down th            K tape biceps inhibition th

## 2018-04-04 ENCOUNTER — OFFICE VISIT (OUTPATIENT)
Dept: OBGYN CLINIC | Facility: CLINIC | Age: 48
End: 2018-04-04
Payer: OTHER MISCELLANEOUS

## 2018-04-04 VITALS
SYSTOLIC BLOOD PRESSURE: 112 MMHG | DIASTOLIC BLOOD PRESSURE: 70 MMHG | HEART RATE: 78 BPM | HEIGHT: 66 IN | BODY MASS INDEX: 23.46 KG/M2 | WEIGHT: 146 LBS

## 2018-04-04 DIAGNOSIS — M75.41 IMPINGEMENT SYNDROME OF RIGHT SHOULDER: ICD-10-CM

## 2018-04-04 DIAGNOSIS — S43.101D: Primary | ICD-10-CM

## 2018-04-04 PROCEDURE — 20610 DRAIN/INJ JOINT/BURSA W/O US: CPT | Performed by: PHYSICIAN ASSISTANT

## 2018-04-04 PROCEDURE — 99213 OFFICE O/P EST LOW 20 MIN: CPT | Performed by: ORTHOPAEDIC SURGERY

## 2018-04-04 RX ORDER — LIDOCAINE HYDROCHLORIDE 10 MG/ML
7 INJECTION, SOLUTION INFILTRATION; PERINEURAL
Status: COMPLETED | OUTPATIENT
Start: 2018-04-04 | End: 2018-04-04

## 2018-04-04 RX ORDER — BETAMETHASONE SODIUM PHOSPHATE AND BETAMETHASONE ACETATE 3; 3 MG/ML; MG/ML
6 INJECTION, SUSPENSION INTRA-ARTICULAR; INTRALESIONAL; INTRAMUSCULAR; SOFT TISSUE
Status: COMPLETED | OUTPATIENT
Start: 2018-04-04 | End: 2018-04-04

## 2018-04-04 RX ADMIN — LIDOCAINE HYDROCHLORIDE 7 ML: 10 INJECTION, SOLUTION INFILTRATION; PERINEURAL at 15:29

## 2018-04-04 RX ADMIN — BETAMETHASONE SODIUM PHOSPHATE AND BETAMETHASONE ACETATE 6 MG: 3; 3 INJECTION, SUSPENSION INTRA-ARTICULAR; INTRALESIONAL; INTRAMUSCULAR; SOFT TISSUE at 15:29

## 2018-04-04 NOTE — PROGRESS NOTES
Assessment:     1  Acromioclavicular separation, type 1, right, subsequent encounter    2  Impingement syndrome of right shoulder        Plan:  The patient was seen and examined by Dr Benitez Mcdaniel and myself  Problem List Items Addressed This Visit        Musculoskeletal and Integument    Acromioclavicular separation, type 1, right, subsequent encounter - Primary     AC joint separation has healed he no longer has pain in the area  Other    Impingement syndrome of right shoulder     Findings and treatment options were discussed with the patient  Discussed the symptoms most likely coming from impingement at this time  Recommend cortisone injection to the subacromial space  Patient tolerated the procedure well  Advised to apply cold compress today  He is to also continue physical therapy for rotator cuff strengthening  Continue work restrictions of no lifting greater than 20 lb  Follow-up in 4 weeks for re-evaluation  All questions were answered to patient's satisfaction  Relevant Orders    Ambulatory referral to Physical Therapy         Subjective:     Patient ID: Leonides Dee is a 41 y o  male  Chief Complaint:  Follow-up right shoulder injury  This is a 82-year-old white male who is following up for a right AC joint grade 1 separation  He suffered a work related injury on January 5, 2018 when he slipped on ice while carrying firewood  Overall his pain has improved, but he continues to have pain with overhead activities and when reaching behind him  He has been attending physical therapy and feels improvement in range of motion and strength, but is concerned about the pain he continues to have             Allergy:  No Known Allergies     Medications:  all current active meds have been reviewed     Past Medical History:  Past Medical History:   Diagnosis Date    Asthma      Past Surgical History:  Past Surgical History:   Procedure Laterality Date    HERNIA REPAIR       Family History:  Family History   Problem Relation Age of Onset    No Known Problems Mother     No Known Problems Father      Social History:  History   Alcohol Use    Yes     History   Drug Use No     History   Smoking Status    Never Smoker   Smokeless Tobacco    Never Used     Review of Systems   Constitutional: Negative  HENT: Negative  Eyes: Negative  Respiratory: Negative  Cardiovascular: Negative  Gastrointestinal: Negative  Endocrine: Negative  Genitourinary: Negative  Musculoskeletal: Positive for arthralgias  Skin: Negative  Neurological: Negative  Hematological: Negative  Psychiatric/Behavioral: Negative  Objective:  BP Readings from Last 1 Encounters:   04/04/18 112/70      Wt Readings from Last 1 Encounters:   04/04/18 66 2 kg (146 lb)      BMI:   Estimated body mass index is 23 57 kg/m² as calculated from the following:    Height as of this encounter: 5' 6" (1 676 m)  Weight as of this encounter: 66 2 kg (146 lb)  BSA:   Estimated body surface area is 1 75 meters squared as calculated from the following:    Height as of this encounter: 5' 6" (1 676 m)  Weight as of this encounter: 66 2 kg (146 lb)  Physical Exam   Constitutional: He is oriented to person, place, and time  He appears well-developed  HENT:   Head: Normocephalic and atraumatic  Eyes: EOM are normal  Pupils are equal, round, and reactive to light  Neck: Neck supple  Musculoskeletal: He exhibits tenderness  Neurological: He is alert and oriented to person, place, and time  Skin: Skin is warm  Psychiatric: He has a normal mood and affect  Nursing note and vitals reviewed      Right Shoulder Exam     Tenderness   Right shoulder tenderness location: Minimal     Range of Motion   Active Abduction: normal   Forward Flexion: normal   External Rotation: normal   Internal Rotation 0 degrees: Lumbar     Muscle Strength   Abduction: 5/5   Internal Rotation: 5/5   External Rotation: 5/5   Biceps: 5/5 Tests   Cross Arm: negative  Drop Arm: negative  Hawkin's test: positive  Impingement: positive    Other   Sensation: normal  Pulse: present    Comments:  Pain with resisted abduction  Positive empty can  Positive speed's  Positive painful arc      Left Shoulder Exam   Left shoulder exam is normal             Large joint arthrocentesis  Date/Time: 4/4/2018 3:29 PM  Consent given by: patient  Site marked: site marked  Timeout: Immediately prior to procedure a time out was called to verify the correct patient, procedure, equipment, support staff and site/side marked as required   Supporting Documentation  Indications: pain   Procedure Details  Location: shoulder - R subacromial bursa  Preparation: alcohol    Needle size: 22 G  Approach: posterior  Medications administered: 7 mL lidocaine 1 %; 6 mg betamethasone acetate-betamethasone sodium phosphate 6 (3-3) mg/mL    Patient tolerance: patient tolerated the procedure well with no immediate complications  Dressing:  Sterile dressing applied

## 2018-04-04 NOTE — ASSESSMENT & PLAN NOTE
Findings and treatment options were discussed with the patient  Discussed the symptoms most likely coming from impingement at this time  Recommend cortisone injection to the subacromial space  Patient tolerated the procedure well  Advised to apply cold compress today  He is to also continue physical therapy for rotator cuff strengthening  Continue work restrictions of no lifting greater than 20 lb  Follow-up in 4 weeks for re-evaluation  All questions were answered to patient's satisfaction

## 2018-04-09 ENCOUNTER — APPOINTMENT (OUTPATIENT)
Dept: PHYSICAL THERAPY | Facility: CLINIC | Age: 48
End: 2018-04-09
Payer: OTHER MISCELLANEOUS

## 2018-04-11 ENCOUNTER — OFFICE VISIT (OUTPATIENT)
Dept: PHYSICAL THERAPY | Facility: CLINIC | Age: 48
End: 2018-04-11
Payer: OTHER MISCELLANEOUS

## 2018-04-11 DIAGNOSIS — S43.101D: Primary | ICD-10-CM

## 2018-04-11 PROCEDURE — 97110 THERAPEUTIC EXERCISES: CPT | Performed by: PHYSICAL THERAPIST

## 2018-04-11 PROCEDURE — 97112 NEUROMUSCULAR REEDUCATION: CPT | Performed by: PHYSICAL THERAPIST

## 2018-04-11 PROCEDURE — 97140 MANUAL THERAPY 1/> REGIONS: CPT | Performed by: PHYSICAL THERAPIST

## 2018-04-11 NOTE — PROGRESS NOTES
Daily Note     Today's date: 2018  Patient name: Hakeem Moon  : 1970  MRN: [id number]  Referring provider: Daniella Cardona PA-C  Dx:   Encounter Diagnosis     ICD-10-CM    1  Acromioclavicular separation, type 1, right, subsequent encounter S43 101D                   Subjective: Pt reports feeling very frustrated that he is not able to have an MRI  Pt reports the cortisone injection at physician's office did not help with pain  Objective: See treatment diary below      Assessment: Tolerated treatment well  Patient exhibited good technique with therapeutic exercises, but had pain with prone rows  Pt is complaint in HEP  Plan: Continue per plan of care         Dx: R shoulder AC joint separation, grade I  EPOC: 18  Precautions: RTC and labral dysfunction  CO-MORBIDITES: asthma  PERSONAL FACTORS: wants to return to exercise, guitar playing, and full duty in a warehouse at work    Manual  2/26 2/28 3/12 3/14 4/2 4/11       PROM R shoulder  JK JK JK th th        TrP release biceps tendon, teres  [facility]enn[facility]       GISTM R shld      th                                     Exercise Diary  2/26 2/28 3/12 3/14 4/2 4/11       Pendulums instruct hep   UBE 2'/2'       Shoulder isometrics: all planes instruct 10x10" 10x10" 10x  10" 10        UT stretch  3x20" 3x20" hep         Pulleys: flex  3' 3' 3' 3' 3'       Pulleys: scap  3' 3' 3' 3' 3'       scap punches      1#x10       Scapular Retraction  10x5" 10x5" 20x5"  prone 10 x10"       Table Slides  Flex  Scap, ER  [id number]" All dir  10x5" All dir  10x5"         AAROM flex   10 10         AARom ER    10         Prone ext    10  1#x10       Prone row    10  2#x10       S/l shoulder ER AROM     10 1#15       Tb LPD     Ox10 Gx15       Tb row     Ox10 Gx15       TB IR     Ox10 Gx15       B ER     Ox10 Gx15       S/l shld flex AROM      x10                                     Modalities  2/26 2/28 3/12 3/14 4/2        TENS w/ cp  CP CP CP  10' -        K tape V down th            K tape biceps inhibition th

## 2018-04-16 ENCOUNTER — APPOINTMENT (OUTPATIENT)
Dept: PHYSICAL THERAPY | Facility: CLINIC | Age: 48
End: 2018-04-16
Payer: OTHER MISCELLANEOUS

## 2018-04-18 ENCOUNTER — OFFICE VISIT (OUTPATIENT)
Dept: PHYSICAL THERAPY | Facility: CLINIC | Age: 48
End: 2018-04-18
Payer: OTHER MISCELLANEOUS

## 2018-04-18 DIAGNOSIS — S43.101D: Primary | ICD-10-CM

## 2018-04-18 DIAGNOSIS — M75.41 IMPINGEMENT SYNDROME OF RIGHT SHOULDER: ICD-10-CM

## 2018-04-18 PROCEDURE — 97014 ELECTRIC STIMULATION THERAPY: CPT

## 2018-04-18 PROCEDURE — 97010 HOT OR COLD PACKS THERAPY: CPT

## 2018-04-18 PROCEDURE — 97140 MANUAL THERAPY 1/> REGIONS: CPT

## 2018-04-18 PROCEDURE — 97110 THERAPEUTIC EXERCISES: CPT

## 2018-04-18 NOTE — PROGRESS NOTES
Daily Note     Today's date: 2018  Patient name: Tarun Burnett  : 1970  MRN: [id number]  Referring provider: Delicia Vicente PA-C  Dx:   Encounter Diagnosis     ICD-10-CM    1  Acromioclavicular separation, type 1, right, subsequent encounter S43 101D                   Subjective: Pt c/o  Ant/post shld pain 3/10  C/o pain below AC jt 5/10 stating this was a new pain this week  Objective: See treatment diary below      Assessment: Tolerated treatment poor  Patient would benefit from continued PT  Attempted ex's pt very painful and worked on pain relief w/ manuals and TENS/CP  Plan: Continue per plan of care  Progress treatment as tolerated      Dx: R shoulder AC joint separation, grade I  EPOC: 18  Precautions: RTC and labral dysfunction  CO-MORBIDITES: asthma  PERSONAL FACTORS: wants to return to exercise, guitar playing, and full duty in a warehouse at work    Manual  2/26 2/28 3/12 3/14 4/2 4/11 4/18      PROM R shoulder  JK JK JK  th JK       TrP release biceps tendon, teres  JK JK JK  th JK      GISTM R shld      th                                     Exercise Diary  2/26 2/28 3/12 3/14 4/2 4/11 4/18      Pendulums instruct hep   UBE 2'/2' 3'/'3      Shoulder isometrics: all planes instruct 10x10" 10x10" 10x  10" 10        UT stretch  3x20" 3x20" hep         Pulleys: flex  3' 3' 3' 3' 3'       Pulleys: scap  3' 3' 3' 3' 3'       scap punches      1#x10       Scapular Retraction  10x5" 10x5" 20x5"  prone 10 x10"       Table Slides  Flex  Scap, ER  [id number]" All dir  10x5" All dir  10x5"         AAROM flex   10 10         AARom ER    10         Prone ext    10  1#x10       Prone row    10  2#x10       S/l shoulder ER AROM     10 1#15       Tb LPD     Ox10 Gx15 Gx15      Tb row     Ox10 Gx15 Gx15      TB IR     Ox10 Gx15       B ER     Ox10 Gx15       S/l shld flex AROM      x10                                     Modalities  2/26 2/28 3/12 3/14 4/2 4/18       TENS w/ cp  CP CP CP  10 - 10'TENs       K tape V down th            K tape biceps inhibition th

## 2018-04-23 ENCOUNTER — OFFICE VISIT (OUTPATIENT)
Dept: PHYSICAL THERAPY | Facility: CLINIC | Age: 48
End: 2018-04-23
Payer: OTHER MISCELLANEOUS

## 2018-04-23 DIAGNOSIS — S43.101D: Primary | ICD-10-CM

## 2018-04-23 DIAGNOSIS — M75.41 IMPINGEMENT SYNDROME OF RIGHT SHOULDER: ICD-10-CM

## 2018-04-23 PROCEDURE — 97140 MANUAL THERAPY 1/> REGIONS: CPT

## 2018-04-23 PROCEDURE — 97110 THERAPEUTIC EXERCISES: CPT

## 2018-04-23 PROCEDURE — 97035 APP MDLTY 1+ULTRASOUND EA 15: CPT

## 2018-04-23 NOTE — PROGRESS NOTES
Daily Note     Today's date: 2018  Patient name: Erickson Velez  : 1970  MRN: [id number]  Referring provider: Nancie Mar PA-C  Dx:   Encounter Diagnosis     ICD-10-CM    1  Acromioclavicular separation, type 1, right, subsequent encounter S43 101D    2  Impingement syndrome of right shoulder M75 41                   Subjective: Pt states still having 3 spots on the shld that bother him  States he did get some temporary relief post TENS LV   Objective: See treatment diary below  Trial US today for pain relief  Assessment: Tolerated treatment fair  Patient reported some relief post US   Plan: Continue per plan of care  Progress treatment as tolerated      Dx: R shoulder AC joint separation, grade I  EPOC: 18  Precautions: RTC and labral dysfunction  CO-MORBIDITES: asthma  PERSONAL FACTORS: wants to return to exercise, guitar playing, and full duty in a warehouse at work    Manual  2/26 2/28 3/12 3/14 4/2 4/11 4/18 4/23     PROM R shoulder  JK JK JK  th JK JK      TrP release biceps tendon, teres  JK JK JK th th [facility]     GISTM R shld      th                            10'         Exercise Diary  2/26 2/28 3/12 3/14 4/2 4/11 4/18 4/23     Pendulums instruct hep   UBE 2'/2' 3'/'3 3'/3'     Shoulder isometrics: all planes instruct 10x10" 10x10" 10x  10" 10        UT stretch  3x20" 3x20" hep         Pulleys: flex  3' 3' 3' 3' 3'       Pinch and hold  3' 3' 3' 3' 3'  10x10"     scap punches      1#x10  1/2 roll  10     Scapular Retraction  10x5" 10x5" 20x5"  prone 10 x10"       pec stretch  1/2 roll  [id number]" All dir  10x5" All dir  10x5"    1'     AAROM flex   10 10         AARom ER    10         Prone ext    10  1#x10  1#20     Prone row    10  2#x10  1#20     S/l shoulder ER AROM     10 1#15  2#  20     Tb LPD     Ox10 Gx15 Gx15 P 20     Tb row     Ox10 Gx15 Gx15 P 20     TB IR     Ox10 Gx15  P 20     B ER     Ox10 Gx15  P 20     S/l shld flex AROM      x10 Modalities  2/26 2/28 3/12 3/14 4/2 4/18 4/23      TENS w/ cp  CP CP CP  10' - 10'TENs       K tape V down th      20% US   5  10'      K tape biceps inhibition th

## 2018-04-25 ENCOUNTER — OFFICE VISIT (OUTPATIENT)
Dept: PHYSICAL THERAPY | Facility: CLINIC | Age: 48
End: 2018-04-25
Payer: OTHER MISCELLANEOUS

## 2018-04-25 DIAGNOSIS — S43.101D: Primary | ICD-10-CM

## 2018-04-25 DIAGNOSIS — M75.41 IMPINGEMENT SYNDROME OF RIGHT SHOULDER: ICD-10-CM

## 2018-04-25 PROCEDURE — G8992 OTHER PT/OT  D/C STATUS: HCPCS | Performed by: PHYSICAL THERAPIST

## 2018-04-25 PROCEDURE — 97035 APP MDLTY 1+ULTRASOUND EA 15: CPT

## 2018-04-25 PROCEDURE — 97530 THERAPEUTIC ACTIVITIES: CPT

## 2018-04-25 PROCEDURE — 97110 THERAPEUTIC EXERCISES: CPT

## 2018-04-25 PROCEDURE — 97140 MANUAL THERAPY 1/> REGIONS: CPT

## 2018-04-25 PROCEDURE — G8991 OTHER PT/OT GOAL STATUS: HCPCS | Performed by: PHYSICAL THERAPIST

## 2018-04-25 NOTE — PROGRESS NOTES
Daily Note     Today's date: 2018  Patient name: Kit Johnson  : 1970  MRN: [id number]  Referring provider: Mic Ascencio PA-C  Dx:   Encounter Diagnosis     ICD-10-CM    1  Acromioclavicular separation, type 1, right, subsequent encounter S43 101D    2  Impingement syndrome of right shoulder M75 41                   Subjective: Pt reports getting one day relief post US LV   States it's the most effective modality  Objective: See treatment diary below      Assessment: Tolerated treatment fair  Patient exhibited good technique with therapeutic exercises and would benefit from continued PT      Plan: Continue per plan of care  Progress treatment as tolerated      Dx: R shoulder AC joint separation, grade I  EPOC: 18  Precautions: RTC and labral dysfunction  CO-MORBIDITES: asthma  PERSONAL FACTORS: wants to return to exercise, guitar playing, and full duty in a warehouse at work    Manual  2/26 2/28 3/12 3/14 4/2 4/11 4/18 4/23 4/25    PROM R shoulder  JK JK JK [facility] JK JK JK     TrP release biceps tendon, teres  JK JK JK th th [place] JK    GISTM R shld      th                            10' 8'        Exercise Diary  2/26 2/28 3/12 3/14 4/2 4/11 4/18 4/23 4/25    Pendulums instruct hep   UBE 2'/2' 3'/'3 3'/3' 3'/3'    Shoulder isometrics: all planes instruct 10x10" 10x10" 10x  10" 10        UT stretch  3x20" 3x20" hep         Pulleys: flex  3' 3' 3' 3' 3'       Pinch and hold  3' 3' 3' 3' 3'  10x10" 10x10"    scap punches      1#x10  1/2 roll  10 2#  1/2 roll 20x    Scapular Retraction  10x5" 10x5" 20x5"  prone 10 x10"       pec stretch  1/2 roll  [id number]" All dir  10x5" All dir  10x5"    1' 2'    AAROM flex   10 10         AARom ER    10         Prone ext    10  1#x10  1#20 1#20    Prone row    10  2#x10  1#20 1#20    S/l shoulder ER AROM     10 1#15  2#  20 2@  20x    Tb LPD     Ox10 Gx15 Gx15 P 20 P 20    Tb row     Ox10 Gx15 Gx15 P 20 P 20    TB IR     Ox10 Gx15  P 20 P 20    B ER     Ox10 Gx15  P 20 P 20    S/l shld flex AROM      x10   np                                  Modalities  2/26 2/28 3/12 3/14 4/2 4/18 4/23 4/25     TENS w/ cp  CP CP CP  10' - 10'TENs       K tape V down th      20% US   5  10'      K tape biceps inhibition th

## 2018-04-30 ENCOUNTER — APPOINTMENT (OUTPATIENT)
Dept: PHYSICAL THERAPY | Facility: CLINIC | Age: 48
End: 2018-04-30
Payer: OTHER MISCELLANEOUS

## 2018-05-02 ENCOUNTER — OFFICE VISIT (OUTPATIENT)
Dept: OBGYN CLINIC | Facility: CLINIC | Age: 48
End: 2018-05-02
Payer: OTHER MISCELLANEOUS

## 2018-05-02 VITALS
HEART RATE: 96 BPM | WEIGHT: 146 LBS | DIASTOLIC BLOOD PRESSURE: 79 MMHG | BODY MASS INDEX: 23.46 KG/M2 | SYSTOLIC BLOOD PRESSURE: 118 MMHG | HEIGHT: 66 IN

## 2018-05-02 DIAGNOSIS — S43.101D: ICD-10-CM

## 2018-05-02 DIAGNOSIS — M75.41 IMPINGEMENT SYNDROME OF RIGHT SHOULDER: Primary | ICD-10-CM

## 2018-05-02 PROCEDURE — 99213 OFFICE O/P EST LOW 20 MIN: CPT | Performed by: ORTHOPAEDIC SURGERY

## 2018-05-02 NOTE — PROGRESS NOTES
Assessment:     1  Impingement syndrome of right shoulder    2  Acromioclavicular separation, type 1, right, subsequent encounter        Plan:  The patient was seen and examined by Dr Kathy Rosa and myself  Problem List Items Addressed This Visit        Musculoskeletal and Integument    Acromioclavicular separation, type 1, right, subsequent encounter       Other    Impingement syndrome of right shoulder - Primary    Relevant Orders    MRI shoulder right wo contrast          Findings consistent with right grade 1 AC joint separation-healed and right shoulder impingement  Findings and treatment options were discussed with the patient  Patient continues to have pain from impingement despite treatment with cortisone injection and physical therapy  Recommend MRI of the right shoulder at this time  Continue work restrictions of no lifting greater than 20 lb  Follow up with Dr Kathy Rosa with MRI results and he will make further treatment recommendations at that time  All questions were answered to patient's satisfaction  Subjective:     Patient ID: Carlos Shields is a 41 y o  male  Chief Complaint:  Follow-up right shoulder injury  This is a 30-year-old white male who is following up for right shoulder impingement     He suffered a work related injury on January 5, 2018 when he slipped on ice while carrying firewood  He felt no relief after the subacromial cortisone injection given last visit  He has been attending physical therapy with no improvement in pain as well             Allergy:  No Known Allergies     Medications:  all current active meds have been reviewed     Past Medical History:  Past Medical History:   Diagnosis Date    Asthma      Past Surgical History:  Past Surgical History:   Procedure Laterality Date    HERNIA REPAIR       Family History:  Family History   Problem Relation Age of Onset    No Known Problems Mother     No Known Problems Father      Social History:  History   Alcohol Use    Yes History   Drug Use No     History   Smoking Status    Never Smoker   Smokeless Tobacco    Never Used     Review of Systems   Constitutional: Negative  HENT: Negative  Eyes: Negative  Respiratory: Negative  Cardiovascular: Negative  Gastrointestinal: Negative  Endocrine: Negative  Genitourinary: Negative  Musculoskeletal: Positive for arthralgias  Skin: Negative  Neurological: Negative  Hematological: Negative  Psychiatric/Behavioral: Negative  Objective:  BP Readings from Last 1 Encounters:   05/02/18 118/79      Wt Readings from Last 1 Encounters:   05/02/18 66 2 kg (146 lb)      BMI:   Estimated body mass index is 23 57 kg/m² as calculated from the following:    Height as of this encounter: 5' 6" (1 676 m)  Weight as of this encounter: 66 2 kg (146 lb)  BSA:   Estimated body surface area is 1 75 meters squared as calculated from the following:    Height as of this encounter: 5' 6" (1 676 m)  Weight as of this encounter: 66 2 kg (146 lb)  Physical Exam   Constitutional: He is oriented to person, place, and time  He appears well-developed  HENT:   Head: Normocephalic and atraumatic  Eyes: EOM are normal  Pupils are equal, round, and reactive to light  Neck: Neck supple  Musculoskeletal: He exhibits tenderness  Neurological: He is alert and oriented to person, place, and time  Skin: Skin is warm  Psychiatric: He has a normal mood and affect  Nursing note and vitals reviewed  Right Shoulder Exam     Tenderness   The patient is experiencing tenderness in the biceps tendon (Subacromial space)      Range of Motion   Active Abduction: normal   Forward Flexion: normal   External Rotation: normal   Internal Rotation 0 degrees: Lumbar     Muscle Strength   Abduction: 5/5   Internal Rotation: 5/5   External Rotation: 5/5   Biceps: 5/5     Tests   Cross Arm: negative  Drop Arm: negative  Hawkin's test: positive  Impingement: positive    Other Sensation: normal  Pulse: present    Comments:  Pain with resisted abduction  Positive empty can  Positive speed's  Positive painful arc      Left Shoulder Exam   Left shoulder exam is normal             Procedures

## 2018-05-16 ENCOUNTER — HOSPITAL ENCOUNTER (OUTPATIENT)
Dept: MRI IMAGING | Facility: HOSPITAL | Age: 48
Discharge: HOME | End: 2018-05-16
Payer: OTHER MISCELLANEOUS

## 2018-05-16 DIAGNOSIS — M75.41 IMPINGEMENT SYNDROME OF RIGHT SHOULDER: ICD-10-CM

## 2018-05-16 PROCEDURE — 73221 MRI JOINT UPR EXTREM W/O DYE: CPT

## 2018-06-07 ENCOUNTER — OFFICE VISIT (OUTPATIENT)
Dept: OBGYN CLINIC | Facility: CLINIC | Age: 48
End: 2018-06-07
Payer: OTHER MISCELLANEOUS

## 2018-06-07 VITALS
HEIGHT: 66 IN | HEART RATE: 78 BPM | BODY MASS INDEX: 23.63 KG/M2 | SYSTOLIC BLOOD PRESSURE: 116 MMHG | WEIGHT: 147 LBS | DIASTOLIC BLOOD PRESSURE: 72 MMHG

## 2018-06-07 DIAGNOSIS — M75.41 IMPINGEMENT SYNDROME OF RIGHT SHOULDER: Primary | ICD-10-CM

## 2018-06-07 PROBLEM — S43.101D: Status: RESOLVED | Noted: 2018-02-13 | Resolved: 2018-06-07

## 2018-06-07 PROCEDURE — 99213 OFFICE O/P EST LOW 20 MIN: CPT | Performed by: ORTHOPAEDIC SURGERY

## 2018-06-07 NOTE — PROGRESS NOTES
Assessment:     1  Impingement syndrome of right shoulder        Plan:  The patient was seen and examined by Dr Peri Roberts and myself  Problem List Items Addressed This Visit        Other    Impingement syndrome of right shoulder - Primary     Findings consistent with right shoulder impingement with tendinitis  Discussed findings and treatment options with the patient  I reviewed patient's right shoulder MRI with him  Discussed prognosis of his right shoulder problem  Patient had tried conservative treatment with therapy and activity modification but continued to have symptoms with impingement  Patient can either continue therapy to see if the shoulder will improve for the next couple months or consider surgical treatment with arthroscopic subacromial decompression and acromioplasty  Patient will consider his options and contact me with his decision after discussed with his employer  I provided patient a script for continue therapy and a work note to limit his activity using the right arm  I will see patient back in 4 weeks for re-evaluation  All patient's questions were answered to his satisfaction  This note is created using dictation transcription  It may contain typographical errors, grammatical errors, improperly dictated words, background noise and other errors  Relevant Orders    Ambulatory referral to Physical Therapy          Subjective:     Patient ID: Allan French is a 41 y o  male  Chief Complaint:  Follow-up right shoulder injury  This is a 82-year-old white male who is following up for right shoulder impingement     He suffered a work related injury on January 5, 2018 when he slipped on ice while carrying firewood  Patient continued to have pain in his right shoulder, posteriorly and anteriorly  He denies pain reach in across the chest to the left shoulder  He returns today to review his right shoulder MRI      Allergy:  No Known Allergies     Medications:  all current active meds have been reviewed     Past Medical History:  Past Medical History:   Diagnosis Date    Asthma      Past Surgical History:  Past Surgical History:   Procedure Laterality Date    HERNIA REPAIR       Family History:  Family History   Problem Relation Age of Onset    No Known Problems Mother     No Known Problems Father      Social History:  History   Alcohol Use    Yes     History   Drug Use No     History   Smoking Status    Never Smoker   Smokeless Tobacco    Never Used     Review of Systems   Constitutional: Negative  HENT: Negative  Eyes: Negative  Respiratory: Negative  Cardiovascular: Negative  Gastrointestinal: Negative  Endocrine: Negative  Genitourinary: Negative  Musculoskeletal: Positive for arthralgias (Right shoulder)  Negative for joint swelling  Skin: Negative  Neurological: Negative  Hematological: Negative  Psychiatric/Behavioral: Negative  Objective:  BP Readings from Last 1 Encounters:   06/07/18 116/72      Wt Readings from Last 1 Encounters:   06/07/18 66 7 kg (147 lb)      BMI:   Estimated body mass index is 23 73 kg/m² as calculated from the following:    Height as of this encounter: 5' 6" (1 676 m)  Weight as of this encounter: 66 7 kg (147 lb)  BSA:   Estimated body surface area is 1 76 meters squared as calculated from the following:    Height as of this encounter: 5' 6" (1 676 m)  Weight as of this encounter: 66 7 kg (147 lb)  Physical Exam   Constitutional: He is oriented to person, place, and time  He appears well-developed  HENT:   Head: Normocephalic and atraumatic  Eyes: EOM are normal  Pupils are equal, round, and reactive to light  Neck: Neck supple  Musculoskeletal: He exhibits tenderness  Neurological: He is alert and oriented to person, place, and time  Skin: Skin is warm  Psychiatric: He has a normal mood and affect  Nursing note and vitals reviewed      Right Shoulder Exam     Tenderness   The patient is experiencing tenderness in the biceps tendon (Subacromial space posteriorly)  Range of Motion   Active Abduction: normal   Forward Flexion: normal   External Rotation: normal   Internal Rotation 0 degrees: Lumbar     Muscle Strength   Abduction: 5/5   Internal Rotation: 5/5   External Rotation: 5/5   Biceps: 5/5     Tests   Apprehension: negative  Cross Arm: negative  Drop Arm: negative  Hawkin's test: positive  Impingement: positive    Other   Erythema: absent  Sensation: normal  Pulse: present    Comments:  Pain with resisted abduction  Positive empty can  Positive speed's  Positive painful arc      Left Shoulder Exam   Left shoulder exam is normal           Right shoulder MRI show rotator cuff tendinitis with interstitial tear within the tendon  No full thickness or partial rotator cuff tear  Labrum and biceps anchor are intact  Type 2 acromion process      Procedures

## 2018-06-07 NOTE — ASSESSMENT & PLAN NOTE
Findings consistent with right shoulder impingement with tendinitis  Discussed findings and treatment options with the patient  I reviewed patient's right shoulder MRI with him  Discussed prognosis of his right shoulder problem  Patient had tried conservative treatment with therapy and activity modification but continued to have symptoms with impingement  Patient can either continue therapy to see if the shoulder will improve for the next couple months or consider surgical treatment with arthroscopic subacromial decompression and acromioplasty  Patient will consider his options and contact me with his decision after discussed with his employer  I provided patient a script for continue therapy and a work note to limit his activity using the right arm  I will see patient back in 4 weeks for re-evaluation  All patient's questions were answered to his satisfaction  This note is created using dictation transcription  It may contain typographical errors, grammatical errors, improperly dictated words, background noise and other errors

## 2018-08-15 ENCOUNTER — OFFICE VISIT (OUTPATIENT)
Dept: OBGYN CLINIC | Facility: CLINIC | Age: 48
End: 2018-08-15
Payer: OTHER MISCELLANEOUS

## 2018-08-15 VITALS
SYSTOLIC BLOOD PRESSURE: 122 MMHG | DIASTOLIC BLOOD PRESSURE: 82 MMHG | HEART RATE: 80 BPM | HEIGHT: 66 IN | BODY MASS INDEX: 23.78 KG/M2 | WEIGHT: 148 LBS

## 2018-08-15 DIAGNOSIS — M75.41 IMPINGEMENT SYNDROME OF RIGHT SHOULDER: Primary | ICD-10-CM

## 2018-08-15 PROCEDURE — 99213 OFFICE O/P EST LOW 20 MIN: CPT | Performed by: ORTHOPAEDIC SURGERY

## 2018-08-15 NOTE — PROGRESS NOTES
Assessment:     1  Impingement syndrome of right shoulder        Plan:  The patient was seen and examined by Dr Na Llanes and myself  Problem List Items Addressed This Visit        Other    Impingement syndrome of right shoulder - Primary     Findings consistent with right shoulder impingement with tendinitis  Findings and treatment options were discussed with patient once again  Discussed prognosis of his right shoulder problem  Patient had tried conservative treatment with therapy and activity modification but continued to have symptoms with impingement  Recommend right shoulder arthroscopy with subacromial decompression  Discussed need for physical therapy after the surgery as well  Patient would like to proceed with the surgery and will contact [employer] to get approval   He will follow up when ready to schedule the surgery  All patient's questions were answered to his satisfaction  This note is created using dictation transcription  It may contain typographical errors, grammatical errors, improperly dictated words, background noise and other errors  Subjective:     Patient ID: Allie Hays is a 41 y o  male  Chief Complaint:  Follow-up right shoulder injury  This is a 63-year-old white male who is following up for right shoulder impingement     He suffered a work related injury on January 5, 2018 when he slipped on ice while carrying firewood  He continues to have pain in his right shoulder, specifically posteriorly  He states the anterior pain has mostly resolved  He would like to discuss surgery again today      Allergy:  No Known Allergies     Medications:  all current active meds have been reviewed     Past Medical History:  Past Medical History:   Diagnosis Date    Asthma      Past Surgical History:  Past Surgical History:   Procedure Laterality Date    HERNIA REPAIR       Family History:  Family History   Problem Relation Age of Onset    No Known Problems Mother     No Known Problems Father      Social History:  History   Alcohol Use    Yes     History   Drug Use No     History   Smoking Status    Never Smoker   Smokeless Tobacco    Never Used     Review of Systems   Constitutional: Negative  HENT: Negative  Eyes: Negative  Respiratory: Negative  Cardiovascular: Negative  Gastrointestinal: Negative  Endocrine: Negative  Genitourinary: Negative  Musculoskeletal: Positive for arthralgias (Right shoulder)  Negative for joint swelling  Skin: Negative  Neurological: Negative  Hematological: Negative  Psychiatric/Behavioral: Negative  Objective:  BP Readings from Last 1 Encounters:   08/15/18 122/82      Wt Readings from Last 1 Encounters:   08/15/18 67 1 kg (148 lb)      BMI:   Estimated body mass index is 23 89 kg/m² as calculated from the following:    Height as of this encounter: 5' 6" (1 676 m)  Weight as of this encounter: 67 1 kg (148 lb)  BSA:   Estimated body surface area is 1 76 meters squared as calculated from the following:    Height as of this encounter: 5' 6" (1 676 m)  Weight as of this encounter: 67 1 kg (148 lb)  Physical Exam   Constitutional: He is oriented to person, place, and time  He appears well-developed  HENT:   Head: Normocephalic and atraumatic  Eyes: EOM are normal  Pupils are equal, round, and reactive to light  Neck: Neck supple  Musculoskeletal: He exhibits tenderness  Neurological: He is alert and oriented to person, place, and time  Skin: Skin is warm  Psychiatric: He has a normal mood and affect  Nursing note and vitals reviewed  Right Shoulder Exam     Tenderness   Right shoulder tenderness location: Subacromial space posteriorly and over scapula      Range of Motion   Active Abduction: normal   Forward Flexion: normal   External Rotation: normal   Internal Rotation 0 degrees: Lumbar     Muscle Strength   Abduction: 5/5   Internal Rotation: 5/5   External Rotation: 5/5   Biceps: 5/5     Tests   Apprehension: negative  Cross Arm: negative  Drop Arm: negative  Hawkin's test: positive  Impingement: positive    Other   Erythema: absent  Sensation: normal  Pulse: present    Comments:  Pain with resisted abduction  Positive empty can  Positive painful arc      Left Shoulder Exam   Left shoulder exam is normal           Right shoulder MRI show rotator cuff tendinitis with interstitial tear within the tendon  No full thickness or partial rotator cuff tear  Labrum and biceps anchor are intact  Type 2 acromion process      Procedures independent

## 2018-08-15 NOTE — ASSESSMENT & PLAN NOTE
Findings consistent with right shoulder impingement with tendinitis  Findings and treatment options were discussed with patient once again  Discussed prognosis of his right shoulder problem  Patient had tried conservative treatment with therapy and activity modification but continued to have symptoms with impingement  Recommend right shoulder arthroscopy with subacromial decompression  Discussed need for physical therapy after the surgery as well  Patient would like to proceed with the surgery and will contact [employer] to get approval   He will follow up when ready to schedule the surgery  All patient's questions were answered to his satisfaction  This note is created using dictation transcription  It may contain typographical errors, grammatical errors, improperly dictated words, background noise and other errors

## 2018-09-06 ENCOUNTER — TELEPHONE (OUTPATIENT)
Dept: OBGYN CLINIC | Facility: HOSPITAL | Age: 48
End: 2018-09-06

## 2018-09-06 NOTE — TELEPHONE ENCOUNTER
Caller: patient  Callback# [phone]  Dr Blas Shoemaker        Patient called requesting a callback to discuss surgery and time frame of how long he will be out of work  Please advise thanks

## 2018-09-06 NOTE — TELEPHONE ENCOUNTER
It would be best to have patient come in to discuss about surgery instead of over the phone, that way we can schedule his surgery if he decided he wants to proceed  Amount of time out of work depends on what was don in the OR and the types of job he is doing

## 2018-09-11 ENCOUNTER — OFFICE VISIT (OUTPATIENT)
Dept: OBGYN CLINIC | Facility: CLINIC | Age: 48
End: 2018-09-11
Payer: OTHER MISCELLANEOUS

## 2018-09-11 VITALS
HEART RATE: 80 BPM | WEIGHT: 148 LBS | BODY MASS INDEX: 23.78 KG/M2 | HEIGHT: 66 IN | DIASTOLIC BLOOD PRESSURE: 72 MMHG | SYSTOLIC BLOOD PRESSURE: 122 MMHG

## 2018-09-11 DIAGNOSIS — M75.41 IMPINGEMENT SYNDROME OF RIGHT SHOULDER: Primary | ICD-10-CM

## 2018-09-11 PROCEDURE — 99215 OFFICE O/P EST HI 40 MIN: CPT | Performed by: ORTHOPAEDIC SURGERY

## 2018-09-11 RX ORDER — TRAMADOL HYDROCHLORIDE 50 MG/1
50 TABLET ORAL EVERY 6 HOURS PRN
Qty: 20 TABLET | Refills: 0 | Status: SHIPPED | OUTPATIENT
Start: 2018-09-11 | End: 2018-10-08 | Stop reason: HOSPADM

## 2018-09-11 NOTE — H&P
Assessment:     1  Impingement syndrome of right shoulder        Plan:  The patient was seen and examined by Dr Gonzalez Bay and myself  Problem List Items Addressed This Visit        Other    Impingement syndrome of right shoulder - Primary     Findings consistent with right shoulder impingement with tendinitis  Findings and treatment options were discussed with patient  Discussed prognosis of his right shoulder problem  Patient had tried conservative treatment with therapy and activity modification but continued to have symptoms with impingement  Recommend right shoulder arthroscopy with subacromial decompression  Patient would like to proceed with scheduling surgery at this time  Risks, benefits and complications of surgery were discussed in detail by Dr Gonzalez Bay and informed consent was obtained  All patient's questions were answered to his satisfaction  This note is created using dictation transcription  It may contain typographical errors, grammatical errors, improperly dictated words, background noise and other errors  Relevant Medications    traMADol (ULTRAM) 50 mg tablet    Other Relevant Orders    Case request operating room: ARTHROSCOPY SHOULDER WITH SUBACROMIAL DECOMPRESSION AND POSSIBLE ROTATOR CUFF REPAIR (Completed)    Durable Medical Equipment    Ambulatory referral to Physical Therapy          Subjective:     Patient ID: Alyssia Maciel is a 57 y o  male  Chief Complaint:  Follow-up right shoulder injury  This is a 37-year-old white male who is following up for right shoulder impingement     He suffered a work related injury on January 5, 2018 when he slipped on ice while carrying firewood  He continues to have the same pain in his right shoulder  He is here today to schedule the right shoulder arthroscopy      Allergy:  No Known Allergies     Medications:  all current active meds have been reviewed     Past Medical History:  Past Medical History:   Diagnosis Date    Asthma      Past Surgical History:  Past Surgical History:   Procedure Laterality Date    HERNIA REPAIR       Family History:  Family History   Problem Relation Age of Onset    No Known Problems Mother     No Known Problems Father      Social History:  History   Alcohol Use    Yes     History   Drug Use No     History   Smoking Status    Never Smoker   Smokeless Tobacco    Never Used     Review of Systems   Constitutional: Negative  HENT: Negative  Eyes: Negative  Respiratory: Negative  Cardiovascular: Negative  Gastrointestinal: Negative  Endocrine: Negative  Genitourinary: Negative  Musculoskeletal: Positive for arthralgias (Right shoulder)  Negative for joint swelling  Skin: Negative  Neurological: Negative  Hematological: Negative  Psychiatric/Behavioral: Negative  Objective:  BP Readings from Last 1 Encounters:   09/11/18 122/72      Wt Readings from Last 1 Encounters:   09/11/18 67 1 kg (148 lb)      BMI:   Estimated body mass index is 23 89 kg/m² as calculated from the following:    Height as of this encounter: 5' 6" (1 676 m)  Weight as of this encounter: 67 1 kg (148 lb)  BSA:   Estimated body surface area is 1 76 meters squared as calculated from the following:    Height as of this encounter: 5' 6" (1 676 m)  Weight as of this encounter: 67 1 kg (148 lb)  Physical Exam   Constitutional: He is oriented to person, place, and time  He appears well-developed  HENT:   Head: Normocephalic and atraumatic  Eyes: EOM are normal  Pupils are equal, round, and reactive to light  Neck: Neck supple  Cardiovascular: Normal rate, regular rhythm and normal heart sounds  No murmur heard  Pulmonary/Chest: Effort normal and breath sounds normal  No respiratory distress  Musculoskeletal: He exhibits tenderness  Neurological: He is alert and oriented to person, place, and time  Skin: Skin is warm  Psychiatric: He has a normal mood and affect     Nursing note and vitals reviewed  Right Shoulder Exam     Tenderness   Right shoulder tenderness location: Subacromial space posteriorly and over scapula  Range of Motion   Active Abduction: normal   Forward Flexion: normal   External Rotation: normal   Internal Rotation 0 degrees: Lumbar     Muscle Strength   Abduction: 5/5   Internal Rotation: 5/5   External Rotation: 5/5   Biceps: 5/5     Tests   Apprehension: negative  Cross Arm: negative  Drop Arm: negative  Hawkin's test: positive  Impingement: positive    Other   Erythema: absent  Sensation: normal  Pulse: present    Comments:  Pain with resisted abduction  Positive empty can  Positive painful arc      Left Shoulder Exam   Left shoulder exam is normal           Right shoulder MRI show rotator cuff tendinitis with interstitial tear within the tendon  No full thickness or partial rotator cuff tear  Labrum and biceps anchor are intact  Type 2 acromion process      Procedures

## 2018-09-11 NOTE — PROGRESS NOTES
Assessment:     1  Impingement syndrome of right shoulder        Plan:  The patient was seen and examined by Dr Aram Alvarado and myself  Problem List Items Addressed This Visit        Other    Impingement syndrome of right shoulder - Primary     Findings consistent with right shoulder impingement with tendinitis  Findings and treatment options were discussed with patient  Discussed prognosis of his right shoulder problem  Patient had tried conservative treatment with therapy and activity modification but continued to have symptoms with impingement  Recommend right shoulder arthroscopy with subacromial decompression  Patient would like to proceed with scheduling surgery at this time  Risks, benefits and complications of surgery were discussed in detail by Dr Aram Alvarado and informed consent was obtained  All patient's questions were answered to his satisfaction  This note is created using dictation transcription  It may contain typographical errors, grammatical errors, improperly dictated words, background noise and other errors  Relevant Medications    traMADol (ULTRAM) 50 mg tablet    Other Relevant Orders    Case request operating room: ARTHROSCOPY SHOULDER WITH SUBACROMIAL DECOMPRESSION AND POSSIBLE ROTATOR CUFF REPAIR (Completed)    Durable Medical Equipment    Ambulatory referral to Physical Therapy          Subjective:     Patient ID: Ismael Brian is a 57 y o  male  Chief Complaint:  Follow-up right shoulder injury  This is a 53-year-old white male who is following up for right shoulder impingement     He suffered a work related injury on January 5, 2018 when he slipped on ice while carrying firewood  He continues to have the same pain in his right shoulder  He is here today to schedule the right shoulder arthroscopy      Allergy:  No Known Allergies     Medications:  all current active meds have been reviewed     Past Medical History:  Past Medical History:   Diagnosis Date    Asthma      Past Surgical History:  Past Surgical History:   Procedure Laterality Date    HERNIA REPAIR       Family History:  Family History   Problem Relation Age of Onset    No Known Problems Mother     No Known Problems Father      Social History:  History   Alcohol Use    Yes     History   Drug Use No     History   Smoking Status    Never Smoker   Smokeless Tobacco    Never Used     Review of Systems   Constitutional: Negative  HENT: Negative  Eyes: Negative  Respiratory: Negative  Cardiovascular: Negative  Gastrointestinal: Negative  Endocrine: Negative  Genitourinary: Negative  Musculoskeletal: Positive for arthralgias (Right shoulder)  Negative for joint swelling  Skin: Negative  Neurological: Negative  Hematological: Negative  Psychiatric/Behavioral: Negative  Objective:  BP Readings from Last 1 Encounters:   09/11/18 122/72      Wt Readings from Last 1 Encounters:   09/11/18 67 1 kg (148 lb)      BMI:   Estimated body mass index is 23 89 kg/m² as calculated from the following:    Height as of this encounter: 5' 6" (1 676 m)  Weight as of this encounter: 67 1 kg (148 lb)  BSA:   Estimated body surface area is 1 76 meters squared as calculated from the following:    Height as of this encounter: 5' 6" (1 676 m)  Weight as of this encounter: 67 1 kg (148 lb)  Physical Exam   Constitutional: He is oriented to person, place, and time  He appears well-developed  HENT:   Head: Normocephalic and atraumatic  Eyes: EOM are normal  Pupils are equal, round, and reactive to light  Neck: Neck supple  Cardiovascular: Normal rate, regular rhythm and normal heart sounds  No murmur heard  Pulmonary/Chest: Effort normal and breath sounds normal  No respiratory distress  Musculoskeletal: He exhibits tenderness  Neurological: He is alert and oriented to person, place, and time  Skin: Skin is warm  Psychiatric: He has a normal mood and affect     Nursing note and vitals reviewed  Right Shoulder Exam     Tenderness   Right shoulder tenderness location: Subacromial space posteriorly and over scapula  Range of Motion   Active Abduction: normal   Forward Flexion: normal   External Rotation: normal   Internal Rotation 0 degrees: Lumbar     Muscle Strength   Abduction: 5/5   Internal Rotation: 5/5   External Rotation: 5/5   Biceps: 5/5     Tests   Apprehension: negative  Cross Arm: negative  Drop Arm: negative  Hawkin's test: positive  Impingement: positive    Other   Erythema: absent  Sensation: normal  Pulse: present    Comments:  Pain with resisted abduction  Positive empty can  Positive painful arc      Left Shoulder Exam   Left shoulder exam is normal           Right shoulder MRI show rotator cuff tendinitis with interstitial tear within the tendon  No full thickness or partial rotator cuff tear  Labrum and biceps anchor are intact  Type 2 acromion process      Procedures

## 2018-09-11 NOTE — ASSESSMENT & PLAN NOTE
Findings consistent with right shoulder impingement with tendinitis  Findings and treatment options were discussed with patient  Discussed prognosis of his right shoulder problem  Patient had tried conservative treatment with therapy and activity modification but continued to have symptoms with impingement  Recommend right shoulder arthroscopy with subacromial decompression  Patient would like to proceed with scheduling surgery at this time  Risks, benefits and complications of surgery were discussed in detail by Dr Heather Pritchett and informed consent was obtained  All patient's questions were answered to his satisfaction  This note is created using dictation transcription  It may contain typographical errors, grammatical errors, improperly dictated words, background noise and other errors

## 2018-09-26 NOTE — PRE-PROCEDURE INSTRUCTIONS
Pre-Surgery Instructions:   Medication Instructions    albuterol (PROAIR HFA) 90 mcg/act inhaler Instructed patient per Anesthesia Guidelines   ibuprofen (MOTRIN) 200 mg tablet Patient was instructed by Physician and understands   traMADol (ULTRAM) 50 mg tablet Instructed patient per Anesthesia Guidelines  Before your operation, you play an important role in decreasing your risk for infection by washing with special antiseptic soap  This is an effective way to reduce bacteria on the skin which may help to prevent infections at the surgical site  Please read the following directions in advance  1  In the week before your operation purchase a 4 ounce bottle of antiseptic soap containing chlorhexidine gluconate 4%  Some brand names include: Aplicare, Endure, and Hibiclens  The cost is usually less than $5 00  · For your convenience, the [address] [place] [address] carries the soap  · It may also be available at your doctor's office or pre-admission testing center, and at most retail pharmacies  · If you are allergic or sensitive to soaps containing chlorhexidine gluconate (CHG), please let your doctor know so another antiseptic soap can be suggested  · CHG antiseptic soap is for external use only  2      The day before your operation follow these directions carefully to get ready  · Place clean lines (sheets) on your bed; you should sleep on clean sheets after your evening shower  · Get clean towels and washcloths ready - you need enough for 2 showers  · Set aside clean underwear, pajamas, and clothing to wear after the shower  Reminders:  · DO NOT use any other soap or body rinse on your skin during or after the antiseptic showers  · DO NOT use lotion , powder, deodorant, or perfume/aftershave of any kind on your skin after your antiseptic shower  · DO NOT shave any body parts in the 24 hours/the day before your operation    · DO NOT get the antiseptic soap in your eyes, ears, nose, mouth, or vaginal area  3      You will need to shower the night before AND the morning of your Surgery  Shower 1:  · The evening before your operation, take the fist shower  · First, shampoo your hair with regular shampoo and rinse it completely before you use the anitseptic soap  After washing and rinsing your hair, rinse your body  · Next, use a clean wash cloth to apply the antiseptic soap and wash your body from the neck down to your toes using 1/2 bottle of the antiseptic soap  You will use the other 1/2 bottle for the second shower  · Clean the area where your incision will be; later this area well for about 2 minutes  · If you ar having head or neck surgery, wash areas with the antiseptic soap  · Rinse yourself completely with running water  · Use a clean towel to dry off  · Wear clean underwear and clothing/pajamas  Shower 2:  · The Morning of your operation, take the second shower following the same steps as Shower 1 using the second 1/2 of the bottle of antiseptic soap  · Use clean cloths and towels to was and dry yourself off  · Wear clean underwear and clothing

## 2018-10-08 ENCOUNTER — ANESTHESIA EVENT (OUTPATIENT)
Dept: PERIOP | Facility: HOSPITAL | Age: 48
End: 2018-10-08
Payer: OTHER MISCELLANEOUS

## 2018-10-08 ENCOUNTER — TELEPHONE (OUTPATIENT)
Dept: OBGYN CLINIC | Facility: HOSPITAL | Age: 48
End: 2018-10-08

## 2018-10-08 ENCOUNTER — HOSPITAL ENCOUNTER (OUTPATIENT)
Facility: HOSPITAL | Age: 48
Setting detail: OUTPATIENT SURGERY
Discharge: HOME | End: 2018-10-08
Attending: ORTHOPAEDIC SURGERY | Admitting: ORTHOPAEDIC SURGERY
Payer: OTHER MISCELLANEOUS

## 2018-10-08 ENCOUNTER — ANESTHESIA (OUTPATIENT)
Dept: PERIOP | Facility: HOSPITAL | Age: 48
End: 2018-10-08
Payer: OTHER MISCELLANEOUS

## 2018-10-08 VITALS
WEIGHT: 145 LBS | SYSTOLIC BLOOD PRESSURE: 119 MMHG | DIASTOLIC BLOOD PRESSURE: 74 MMHG | BODY MASS INDEX: 23.3 KG/M2 | HEART RATE: 56 BPM | HEIGHT: 66 IN | OXYGEN SATURATION: 99 % | TEMPERATURE: 97 F | RESPIRATION RATE: 16 BRPM

## 2018-10-08 DIAGNOSIS — M75.41 IMPINGEMENT SYNDROME OF RIGHT SHOULDER: Primary | ICD-10-CM

## 2018-10-08 PROCEDURE — 29828 SHO ARTHRS SRG BICP TENODSIS: CPT | Performed by: PHYSICIAN ASSISTANT

## 2018-10-08 PROCEDURE — C1713 ANCHOR/SCREW BN/BN,TIS/BN: HCPCS | Performed by: ORTHOPAEDIC SURGERY

## 2018-10-08 PROCEDURE — 29826 SHO ARTHRS SRG DECOMPRESSION: CPT | Performed by: PHYSICIAN ASSISTANT

## 2018-10-08 PROCEDURE — 29826 SHO ARTHRS SRG DECOMPRESSION: CPT | Performed by: ORTHOPAEDIC SURGERY

## 2018-10-08 PROCEDURE — 29828 SHO ARTHRS SRG BICP TENODSIS: CPT | Performed by: ORTHOPAEDIC SURGERY

## 2018-10-08 PROCEDURE — 29806 SHO ARTHRS SRG CAPSULORRAPHY: CPT | Performed by: ORTHOPAEDIC SURGERY

## 2018-10-08 DEVICE — ANCHOR SUT 4.75 X 19.1MM CLS EYELET SWIVELOCK C: Type: IMPLANTABLE DEVICE | Site: SHOULDER | Status: FUNCTIONAL

## 2018-10-08 DEVICE — ANCHOR SUT 2.9 X 15.5MM BIO-PUSHLOCK STRL: Type: IMPLANTABLE DEVICE | Site: SHOULDER | Status: FUNCTIONAL

## 2018-10-08 RX ORDER — MEPERIDINE HYDROCHLORIDE 50 MG/ML
12.5 INJECTION INTRAMUSCULAR; INTRAVENOUS; SUBCUTANEOUS
Status: DISCONTINUED | OUTPATIENT
Start: 2018-10-08 | End: 2018-10-08 | Stop reason: HOSPADM

## 2018-10-08 RX ORDER — EPINEPHRINE 1 MG/ML
INJECTION, SOLUTION, CONCENTRATE INTRAVENOUS AS NEEDED
Status: DISCONTINUED | OUTPATIENT
Start: 2018-10-08 | End: 2018-10-08 | Stop reason: HOSPADM

## 2018-10-08 RX ORDER — PROPOFOL 10 MG/ML
INJECTION, EMULSION INTRAVENOUS AS NEEDED
Status: DISCONTINUED | OUTPATIENT
Start: 2018-10-08 | End: 2018-10-08 | Stop reason: SURG

## 2018-10-08 RX ORDER — PROPOFOL 10 MG/ML
INJECTION, EMULSION INTRAVENOUS CONTINUOUS PRN
Status: DISCONTINUED | OUTPATIENT
Start: 2018-10-08 | End: 2018-10-08 | Stop reason: SURG

## 2018-10-08 RX ORDER — SODIUM CHLORIDE 9 MG/ML
INJECTION, SOLUTION INTRAVENOUS AS NEEDED
Status: DISCONTINUED | OUTPATIENT
Start: 2018-10-08 | End: 2018-10-08 | Stop reason: HOSPADM

## 2018-10-08 RX ORDER — ACETAMINOPHEN 325 MG/1
650 TABLET ORAL EVERY 6 HOURS PRN
Status: DISCONTINUED | OUTPATIENT
Start: 2018-10-08 | End: 2018-10-08 | Stop reason: HOSPADM

## 2018-10-08 RX ORDER — SODIUM CHLORIDE, SODIUM LACTATE, POTASSIUM CHLORIDE, CALCIUM CHLORIDE 600; 310; 30; 20 MG/100ML; MG/100ML; MG/100ML; MG/100ML
75 INJECTION, SOLUTION INTRAVENOUS CONTINUOUS
Status: DISCONTINUED | OUTPATIENT
Start: 2018-10-08 | End: 2018-10-08 | Stop reason: HOSPADM

## 2018-10-08 RX ORDER — METOCLOPRAMIDE HYDROCHLORIDE 5 MG/ML
10 INJECTION INTRAMUSCULAR; INTRAVENOUS ONCE AS NEEDED
Status: DISCONTINUED | OUTPATIENT
Start: 2018-10-08 | End: 2018-10-08 | Stop reason: HOSPADM

## 2018-10-08 RX ORDER — LABETALOL HYDROCHLORIDE 5 MG/ML
5 INJECTION, SOLUTION INTRAVENOUS AS NEEDED
Status: DISCONTINUED | OUTPATIENT
Start: 2018-10-08 | End: 2018-10-08 | Stop reason: HOSPADM

## 2018-10-08 RX ORDER — PROMETHAZINE HYDROCHLORIDE 25 MG/ML
6.25 INJECTION, SOLUTION INTRAMUSCULAR; INTRAVENOUS ONCE AS NEEDED
Status: DISCONTINUED | OUTPATIENT
Start: 2018-10-08 | End: 2018-10-08 | Stop reason: HOSPADM

## 2018-10-08 RX ORDER — ONDANSETRON 2 MG/ML
4 INJECTION INTRAMUSCULAR; INTRAVENOUS ONCE AS NEEDED
Status: COMPLETED | OUTPATIENT
Start: 2018-10-08 | End: 2018-10-08

## 2018-10-08 RX ORDER — MIDAZOLAM HYDROCHLORIDE 1 MG/ML
INJECTION INTRAMUSCULAR; INTRAVENOUS AS NEEDED
Status: DISCONTINUED | OUTPATIENT
Start: 2018-10-08 | End: 2018-10-08 | Stop reason: SURG

## 2018-10-08 RX ORDER — OXYCODONE HYDROCHLORIDE AND ACETAMINOPHEN 5; 325 MG/1; MG/1
1 TABLET ORAL EVERY 4 HOURS PRN
Status: DISCONTINUED | OUTPATIENT
Start: 2018-10-08 | End: 2018-10-08 | Stop reason: HOSPADM

## 2018-10-08 RX ORDER — CYCLOBENZAPRINE HCL 10 MG
10 TABLET ORAL 3 TIMES DAILY PRN
Qty: 30 TABLET | Refills: 0 | Status: SHIPPED | OUTPATIENT
Start: 2018-10-08 | End: 2018-10-24 | Stop reason: SDUPTHER

## 2018-10-08 RX ORDER — KETOROLAC TROMETHAMINE 30 MG/ML
INJECTION, SOLUTION INTRAMUSCULAR; INTRAVENOUS AS NEEDED
Status: DISCONTINUED | OUTPATIENT
Start: 2018-10-08 | End: 2018-10-08 | Stop reason: SURG

## 2018-10-08 RX ORDER — HYDROMORPHONE HCL/PF 1 MG/ML
0.2 SYRINGE (ML) INJECTION
Status: DISCONTINUED | OUTPATIENT
Start: 2018-10-08 | End: 2018-10-08 | Stop reason: HOSPADM

## 2018-10-08 RX ORDER — ROPIVACAINE HYDROCHLORIDE 5 MG/ML
INJECTION, SOLUTION EPIDURAL; INFILTRATION; PERINEURAL AS NEEDED
Status: DISCONTINUED | OUTPATIENT
Start: 2018-10-08 | End: 2018-10-08 | Stop reason: SURG

## 2018-10-08 RX ORDER — OXYCODONE HYDROCHLORIDE AND ACETAMINOPHEN 5; 325 MG/1; MG/1
TABLET ORAL
Qty: 50 TABLET | Refills: 0 | Status: SHIPPED | OUTPATIENT
Start: 2018-10-08 | End: 2018-10-23 | Stop reason: HOSPADM

## 2018-10-08 RX ORDER — FENTANYL CITRATE 50 UG/ML
INJECTION, SOLUTION INTRAMUSCULAR; INTRAVENOUS AS NEEDED
Status: DISCONTINUED | OUTPATIENT
Start: 2018-10-08 | End: 2018-10-08 | Stop reason: SURG

## 2018-10-08 RX ORDER — HYDRALAZINE HYDROCHLORIDE 20 MG/ML
5 INJECTION INTRAMUSCULAR; INTRAVENOUS AS NEEDED
Status: DISCONTINUED | OUTPATIENT
Start: 2018-10-08 | End: 2018-10-08 | Stop reason: HOSPADM

## 2018-10-08 RX ORDER — KETOROLAC TROMETHAMINE 10 MG/1
10 TABLET, FILM COATED ORAL EVERY 8 HOURS
Qty: 15 TABLET | Refills: 0 | Status: SHIPPED | OUTPATIENT
Start: 2018-10-08 | End: 2021-04-28

## 2018-10-08 RX ORDER — ONDANSETRON 2 MG/ML
4 INJECTION INTRAMUSCULAR; INTRAVENOUS EVERY 6 HOURS PRN
Status: DISCONTINUED | OUTPATIENT
Start: 2018-10-08 | End: 2018-10-08 | Stop reason: HOSPADM

## 2018-10-08 RX ORDER — FENTANYL CITRATE/PF 50 MCG/ML
25 SYRINGE (ML) INJECTION
Status: DISCONTINUED | OUTPATIENT
Start: 2018-10-08 | End: 2018-10-08 | Stop reason: HOSPADM

## 2018-10-08 RX ORDER — HYDROMORPHONE HCL/PF 1 MG/ML
0.5 SYRINGE (ML) INJECTION
Status: DISCONTINUED | OUTPATIENT
Start: 2018-10-08 | End: 2018-10-08 | Stop reason: HOSPADM

## 2018-10-08 RX ADMIN — PROPOFOL 50 MG: 10 INJECTION, EMULSION INTRAVENOUS at 08:36

## 2018-10-08 RX ADMIN — MIDAZOLAM HYDROCHLORIDE 1 MG: 1 INJECTION, SOLUTION INTRAMUSCULAR; INTRAVENOUS at 09:15

## 2018-10-08 RX ADMIN — PROPOFOL 40 MG: 10 INJECTION, EMULSION INTRAVENOUS at 09:00

## 2018-10-08 RX ADMIN — SODIUM CHLORIDE, SODIUM LACTATE, POTASSIUM CHLORIDE, AND CALCIUM CHLORIDE: .6; .31; .03; .02 INJECTION, SOLUTION INTRAVENOUS at 07:05

## 2018-10-08 RX ADMIN — FENTANYL CITRATE 50 MCG: 50 INJECTION, SOLUTION INTRAMUSCULAR; INTRAVENOUS at 07:35

## 2018-10-08 RX ADMIN — MIDAZOLAM HYDROCHLORIDE 2 MG: 1 INJECTION, SOLUTION INTRAMUSCULAR; INTRAVENOUS at 07:14

## 2018-10-08 RX ADMIN — CEFAZOLIN SODIUM 1000 MG: 1 SOLUTION INTRAVENOUS at 07:32

## 2018-10-08 RX ADMIN — KETOROLAC TROMETHAMINE 30 MG: 30 INJECTION, SOLUTION INTRAMUSCULAR at 09:40

## 2018-10-08 RX ADMIN — SODIUM CHLORIDE, SODIUM LACTATE, POTASSIUM CHLORIDE, AND CALCIUM CHLORIDE 75 ML/HR: .6; .31; .03; .02 INJECTION, SOLUTION INTRAVENOUS at 10:54

## 2018-10-08 RX ADMIN — SODIUM CHLORIDE, SODIUM LACTATE, POTASSIUM CHLORIDE, AND CALCIUM CHLORIDE 75 ML/HR: .6; .31; .03; .02 INJECTION, SOLUTION INTRAVENOUS at 06:36

## 2018-10-08 RX ADMIN — MIDAZOLAM HYDROCHLORIDE 1 MG: 1 INJECTION, SOLUTION INTRAMUSCULAR; INTRAVENOUS at 09:10

## 2018-10-08 RX ADMIN — ROPIVACAINE HYDROCHLORIDE 25 ML: 5 INJECTION, SOLUTION EPIDURAL; INFILTRATION; PERINEURAL at 07:18

## 2018-10-08 RX ADMIN — ONDANSETRON HYDROCHLORIDE 4 MG: 2 INJECTION, SOLUTION INTRAMUSCULAR; INTRAVENOUS at 12:10

## 2018-10-08 RX ADMIN — FENTANYL CITRATE 50 MCG: 50 INJECTION, SOLUTION INTRAMUSCULAR; INTRAVENOUS at 07:15

## 2018-10-08 RX ADMIN — PROPOFOL 75 MCG/KG/MIN: 10 INJECTION, EMULSION INTRAVENOUS at 07:38

## 2018-10-08 NOTE — OP NOTE
OPERATIVE REPORT  PATIENT NAME: Luiza Martinez    :  1970  MRN: [id number]  Pt Location:  OR ROOM 01    SURGERY DATE: 10/8/2018    Surgeon(s) and Role:     * Luly Mota MD - Primary     * Treasure Blankenship PA-C - Assisting    Preop Diagnosis:  Impingement syndrome of right shoulder [M75 41]    Post-Op Diagnosis Codes:     * Impingement syndrome of right shoulder     * Anterior Labrum tear     * Bicep achor tear    Procedure(s) (LRB):  ARTHROSCOPY SHOULDER WITH SUBACROMIAL DECOMPRESSION AND LABRAR REPAIR, BICEP TENOLYSIS (Right), ACROMIOPLASTY, BICEPS TENODESIS    Specimen(s):  * No specimens in log *    Estimated Blood Loss:   Minimal    Drains:  NA    Anesthesia Type:   Scalene block, sedation    Hardware:  PushLock x 1, SwiveLock x1    Operative Indications:  Impingement syndrome of right shoulder [M75 41]  Indications:  Luiza Martinez is a 57y o  years old male diagnosed with right shoulder impingement, AC joint DJD  Patient failed conservative treatments and elected to proceed with surgical intervention  The risks and complications are discussed with the patient  The patient consented to the procedure  Operating findings:  The patient was born into the operating room, properly identified, anesthetized with scalene block and LMA without any difficulty  Patient was then placed in lateral decubitus position with right up  The right arm and shoulder was then prepped and draped in a sterile fashion  The right arm was placed on a skin traction device with 10 pound weight, abducted about 30 and forward flexed about 10     4 portals were utilized for Instrumentation  The scope was first introduced into Glenohumeral joint through the posterolateral portal   Inspection of the joint was done showing the patient's labrum appeared to be degenerative tear anteriorly and posteriorly  The bicep tendon is intact  The biceps anchor appeared to be torned but not unstable  There was grade 0 glenoid arthritis   There was no loose body in the joint  The rotator cuff appeared to be intact intra-articularly  2 anterior portals were created  The anterior labrum was debrided and the bone was roughen with bir  A taper suture was placed into the anterior capsule to tighten up the capsule and an achor was used to stabilize the labrum anteriorly  A loop suture was passed into the bicep at the beginning of the bicipital groove  Once the bicep tendon was prepared, the biceps tendon was then divided at the base  An anchor hole was then created in the bicipital groove  Biceps tenodesis was then done with an anchor  The fluid was then drained out of the shoulder joint  Attention was then turned to the subacromial space  The scope was redirected into the subacromial space through the posterior portal   The shaver was introduced through the lateral portal and debridement of the bursal tissue was then done  Inspection of the rotator cuff showed that the patient has no rotator cuff tear  The coracoacromial ligament is then detached anteriorly off the acromion process  An acromioplasty was then done  Once the acromioplasty is done, patient's subacromion space appeared to be have more room  The acromioclavicular joint was then inspected  Patient appeared to have good space between the distal clavicle and the acromion process  There was degenerative changes over the distal clavicle  Distal clavicle resection was not necessary since there is a large space  Bleeding was controlled with the electrocautery Bovie  Excess fluid was then drained out of the subacromial space  The instruments were removed  The incisions were then closed with nylon and sterile bulky dressing was applied  A sling was then placed  Patient tolerated the procedure well without any complication  Patient was then extubated and transferred to the recovery room for post-op care  Family was contacted  Mrs Dail Gottron was required in the OR in assist in operating the camera, manipulating the shoulder, and carry out the repair       Complications:   None    Patient Disposition:  PACU     SIGNATURE: Ngoc Robbins MD  DATE: October 8, 2018  TIME: 9:47 AM

## 2018-10-08 NOTE — TELEPHONE ENCOUNTER
Dr Funmi Arriaga    Patient had surgery with you today  He left a voice mail stating that he had a call from us but I do not see any task  I left a vm letting him know I got his message and was not sure who called  I instructed him that unless he was having issues he did not need to check back that I would get a message to you to see if you had called

## 2018-10-08 NOTE — DISCHARGE SUMMARY
[facility] Discharge Note  Luiza Martinez, 57 y o  male  MRN: [id number]      Preoperative diagnosis: right shoulder impingement    Postoperative diagnosis: right shoulder impingement, labral tear, biceps tendonopathy    Procedure: right shoulder arthroscopy with subacromial decompression, biceps tenodesis, labral repair    After procedure, patient was brought to PACU  Pain was controlled with IV and oral pain medication  Patient was discharged to home after cleared by anesthesia and when criteria was met  Condition: good    Discharge medications:   See after visit summary for reconciled discharge medications provided to patient and family  Please refer to discharge instructions for further information

## 2018-10-08 NOTE — ANESTHESIA PROCEDURE NOTES
Peripheral Block    Patient location during procedure: pre-op  Start time: 10/8/2018 7:14 AM  Removal time: 10/8/2018 7:20 AM  Reason for block: at surgeon's request and post-op pain management  Staffing  Anesthesiologist: Faisal Sancehz  Performed: anesthesiologist   Preanesthetic Checklist  Completed: patient identified, site marked, surgical consent, pre-op evaluation, timeout performed, IV checked, risks and benefits discussed and monitors and equipment checked  Peripheral Block  Patient position: sitting  Prep: Betadine  Patient monitoring: heart rate, cardiac monitor, continuous pulse ox and frequent blood pressure checks  Block type: interscalene  Laterality: right  Injection technique: single-shot  Procedures: ultrasound guided  Ultrasound permanent image saved  Local infiltration: ropivacaine  Infiltration strength: 0 5 %  Dose: 25 mL  Needle  Needle type: Stimuplex   Needle gauge: 21 G  Needle length: 10 cm  Needle localization: anatomical landmarks and ultrasound guidance  Test dose: negative  Assessment  Injection assessment: incremental injection, local visualized surrounding nerve on ultrasound, negative aspiration for CSF, negative aspiration for heme and no paresthesia on injection  Heart rate change: no  Slow fractionated injection: yes  Post-procedure:  site cleaned  patient tolerated the procedure well with no immediate complications

## 2018-10-08 NOTE — ANESTHESIA PREPROCEDURE EVALUATION
Review of Systems/Medical History  Patient summary reviewed  Chart reviewed  History of anesthetic complications PONV    Cardiovascular  Negative cardio ROS Exercise tolerance (METS): >4,     Pulmonary  Asthma , well controlled/ stable ,        GI/Hepatic  Negative GI/hepatic ROS   No GERD ,        Negative  ROS        Endo/Other  Negative endo/other ROS      GYN  Negative gynecology ROS          Hematology  Negative hematology ROS      Musculoskeletal  Negative musculoskeletal ROS        Neurology  Negative neurology ROS      Psychology   Negative psychology ROS              Physical Exam    Airway    Mallampati score: II  TM Distance: >3 FB  Neck ROM: full     Dental   No notable dental hx     Cardiovascular  Comment: Negative ROS, Rhythm: regular, Rate: normal, Cardiovascular exam normal    Pulmonary  Pulmonary exam normal Breath sounds clear to auscultation,     Other Findings        Anesthesia Plan  ASA Score- 2     Anesthesia Type- IV sedation with anesthesia and regional with ASA Monitors  Additional Monitors:   Airway Plan:     Comment: Discussed plan for SS right IS PNB + MAC w/GA LMA as back-up  Jonathan Zapata Plan Factors-    Induction- intravenous  Postoperative Plan- Plan for postoperative opioid use  Informed Consent- Anesthetic plan and risks discussed with patient  I personally reviewed this patient with the CRNA  Discussed and agreed on the Anesthesia Plan with the CRNA  Jonathan Zapata

## 2018-10-08 NOTE — DISCHARGE INSTRUCTIONS
POST-OPERATIVE SHOULDER INSTRUCTIONS 1    The principal surgical findings in your shoulder were:    1  Right shoulder labral tear    2  Right shoulder biceps tendinopathy    3  Right shoulder impingement    The following corrective procedures were performed:     1  Labral repair    2  Biceps tenodesis    3  Subacromial decompression        FOLLOW-UP:    You will need an appointment in:   Please call the office at   today  GENERAL INSTRUCTIONS:     · Apply an ice pack to your shoulder for the next 12-24 hours  Bruising may appear on your arm or chest in a few days  · Your sling is only for comfort  You may remove it part-time or completely when you wish  · If you have an upset stomach, take only cool, clear liquids such as Gatorade, Jello, or Ginger ale  If nausea persists more then 24 hours, notify the office  · Low-grade temperature is not uncommon after surgery  However, of your temperature exceeds 101 degrees, please notify the office  · Any prescriptions you received before or after surgery should be filled immediately and taken according to directions on the label  Taking medication with food or with a glass of milk will avoid stomach upset  EXERCISES:     · Move and use your arm as comfort allows in any direction, but does not cause pain  Restrict lifting, pushing, and pulling until seen in the office  · Bend forward at the waist, allowing your operated arm to hang toward the floor  Hold a can of soup or object with similar weight, and swing the arm gently in all directions for 30 seconds  Repeat 4-5 times daily  · Lie on your back with your elbow at your side  Bend your elbow 90 degrees, then rotate the forearm toward and away from your trunk in a waving motion for 30 seconds  · Bend and straighten your elbow alternately for 30 seconds  · Lace your fingers together; with your opposite arm, carry your operated arm overhead as far as possible, then down again   Repeat 10 times    · De the above exercises 4-6 timed daily, always within reasonable comfort range  · For the first 48 hours, inhale deeply and hold your breath for 3 seconds; exhale completely  Repeat 10 times, 4 times daily  · If you smoke, avoid cigarettes for 48 hours  · Check arm pulse of the affected arm, every 2 hours for the first 24 hours and then every 8 hours  Observe and report any signs of loss of circulation of affected arm as well as any redness, excessive swelling, or drainage from incision  BANDAGES:    · Your bandage may show blood stains within 1-12 hours  This is mostly fluid that was used to irrigate your shoulder, slightly stained with blood  It is no cause for concern  However, if your bandage becomes saturated, notify my office right away  · Remove and discard your bandages and yellow gauze after 48 hours  Apply Band-aids, or apply a dry sterile dressing to the wound or, dressing change as discussed post-operatively with orthopedic surgeon and your family  · May shower in 72 hours  Do not soak the incisions  · ABDs are used under the affected arm (for female patients, under the breasts also) to absorb moisture and to prevent skin breakdown  They should be changed daily  · Keep wound dry and clean  CLOTHING:    · Sling/abduction sling may be worn over clothes  · Female patients may start wearing a bra when they feel comfortable  WORK:     · Your comfort should be your guide for returning to work  Avoid overhead positions for 3 weeks, except for your exercises  · Limit lifting, pushing, or pulling according to comfort

## 2018-10-08 NOTE — H&P (VIEW-ONLY)
Assessment:     1  Impingement syndrome of right shoulder        Plan:  The patient was seen and examined by Dr Caitlin Bell and myself  Problem List Items Addressed This Visit        Other    Impingement syndrome of right shoulder - Primary     Findings consistent with right shoulder impingement with tendinitis  Findings and treatment options were discussed with patient  Discussed prognosis of his right shoulder problem  Patient had tried conservative treatment with therapy and activity modification but continued to have symptoms with impingement  Recommend right shoulder arthroscopy with subacromial decompression  Patient would like to proceed with scheduling surgery at this time  Risks, benefits and complications of surgery were discussed in detail by Dr Caitlin Bell and informed consent was obtained  All patient's questions were answered to his satisfaction  This note is created using dictation transcription  It may contain typographical errors, grammatical errors, improperly dictated words, background noise and other errors  Relevant Medications    traMADol (ULTRAM) 50 mg tablet    Other Relevant Orders    Case request operating room: ARTHROSCOPY SHOULDER WITH SUBACROMIAL DECOMPRESSION AND POSSIBLE ROTATOR CUFF REPAIR (Completed)    Durable Medical Equipment    Ambulatory referral to Physical Therapy          Subjective:     Patient ID: Drew Dempsey is a 57 y o  male  Chief Complaint:  Follow-up right shoulder injury  This is a 53-year-old white male who is following up for right shoulder impingement     He suffered a work related injury on January 5, 2018 when he slipped on ice while carrying firewood  He continues to have the same pain in his right shoulder  He is here today to schedule the right shoulder arthroscopy      Allergy:  No Known Allergies     Medications:  all current active meds have been reviewed     Past Medical History:  Past Medical History:   Diagnosis Date    Asthma      Past Surgical History:  Past Surgical History:   Procedure Laterality Date    HERNIA REPAIR       Family History:  Family History   Problem Relation Age of Onset    No Known Problems Mother     No Known Problems Father      Social History:  History   Alcohol Use    Yes     History   Drug Use No     History   Smoking Status    Never Smoker   Smokeless Tobacco    Never Used     Review of Systems   Constitutional: Negative  HENT: Negative  Eyes: Negative  Respiratory: Negative  Cardiovascular: Negative  Gastrointestinal: Negative  Endocrine: Negative  Genitourinary: Negative  Musculoskeletal: Positive for arthralgias (Right shoulder)  Negative for joint swelling  Skin: Negative  Neurological: Negative  Hematological: Negative  Psychiatric/Behavioral: Negative  Objective:  BP Readings from Last 1 Encounters:   09/11/18 122/72      Wt Readings from Last 1 Encounters:   09/11/18 67 1 kg (148 lb)      BMI:   Estimated body mass index is 23 89 kg/m² as calculated from the following:    Height as of this encounter: 5' 6" (1 676 m)  Weight as of this encounter: 67 1 kg (148 lb)  BSA:   Estimated body surface area is 1 76 meters squared as calculated from the following:    Height as of this encounter: 5' 6" (1 676 m)  Weight as of this encounter: 67 1 kg (148 lb)  Physical Exam   Constitutional: He is oriented to person, place, and time  He appears well-developed  HENT:   Head: Normocephalic and atraumatic  Eyes: EOM are normal  Pupils are equal, round, and reactive to light  Neck: Neck supple  Cardiovascular: Normal rate, regular rhythm and normal heart sounds  No murmur heard  Pulmonary/Chest: Effort normal and breath sounds normal  No respiratory distress  Musculoskeletal: He exhibits tenderness  Neurological: He is alert and oriented to person, place, and time  Skin: Skin is warm  Psychiatric: He has a normal mood and affect     Nursing note and vitals reviewed  Right Shoulder Exam     Tenderness   Right shoulder tenderness location: Subacromial space posteriorly and over scapula  Range of Motion   Active Abduction: normal   Forward Flexion: normal   External Rotation: normal   Internal Rotation 0 degrees: Lumbar     Muscle Strength   Abduction: 5/5   Internal Rotation: 5/5   External Rotation: 5/5   Biceps: 5/5     Tests   Apprehension: negative  Cross Arm: negative  Drop Arm: negative  Hawkin's test: positive  Impingement: positive    Other   Erythema: absent  Sensation: normal  Pulse: present    Comments:  Pain with resisted abduction  Positive empty can  Positive painful arc      Left Shoulder Exam   Left shoulder exam is normal           Right shoulder MRI show rotator cuff tendinitis with interstitial tear within the tendon  No full thickness or partial rotator cuff tear  Labrum and biceps anchor are intact  Type 2 acromion process      Procedures

## 2018-10-10 ENCOUNTER — EVALUATION (OUTPATIENT)
Dept: PHYSICAL THERAPY | Facility: MEDICAL CENTER | Age: 48
End: 2018-10-10
Payer: OTHER MISCELLANEOUS

## 2018-10-10 DIAGNOSIS — S43.431D TEAR OF RIGHT GLENOID LABRUM, SUBSEQUENT ENCOUNTER: ICD-10-CM

## 2018-10-10 DIAGNOSIS — Z98.890 STATUS POST SUBACROMIAL DECOMPRESSION: ICD-10-CM

## 2018-10-10 DIAGNOSIS — M75.41 IMPINGEMENT SYNDROME OF RIGHT SHOULDER: Primary | ICD-10-CM

## 2018-10-10 PROCEDURE — 97164 PT RE-EVAL EST PLAN CARE: CPT | Performed by: PHYSICAL THERAPIST

## 2018-10-10 PROCEDURE — G8990 OTHER PT/OT CURRENT STATUS: HCPCS | Performed by: PHYSICAL THERAPIST

## 2018-10-10 PROCEDURE — 97110 THERAPEUTIC EXERCISES: CPT | Performed by: PHYSICAL THERAPIST

## 2018-10-10 PROCEDURE — G8991 OTHER PT/OT GOAL STATUS: HCPCS | Performed by: PHYSICAL THERAPIST

## 2018-10-10 NOTE — PROGRESS NOTES
PT Evaluation     Today's date: 10/10/2018  Patient name: Virginia Harrison  : 1970  MRN: [id number]  Referring provider: Zamzam Garcia MD  Dx:   Encounter Diagnosis     ICD-10-CM    1  Impingement syndrome of right shoulder M75 41 Ambulatory referral to Physical Therapy   2  Status post subacromial decompression Z98 890                   Assessment  Impairments: abnormal or restricted ROM, activity intolerance, impaired physical strength, lacks appropriate home exercise program, pain with function and poor posture     Assessment details: Virginia Harrison is a 57 y o  male who presents with Impingement syndrome of right shoulder  (primary encounter diagnosis)  Status post subacromial decompression  Tear of right glenoid labrum, subsequent encounter  Patient presents alert and oriented with the above impairments  [facility] will benefit from PT to addres deficits in order to maximize and return to prior level of function  No further referral appears necessary at this time based upon examination results  Prognosis is good given HEP compliance  Please contact me if you have any questions or recommendations  Pt expresses that he would like to "go hard" with therapy in order to return to prior level of function  Spent time discussing with patient caution following repair and necessity to follow physician protocol  Dressing was removed today; no drainage or redness present  Understanding of Dx/Px/POC: good   Prognosis: good    Goals  Short Term Goals:  1  Pain decreased 3 subjective ratings in 6 weeks  2  PROM within normal limits in 6 weeks    Long Term Goals:  1  AROM within normal limits in 12 weeks  2  Patient will be independent with IADLS/ADLS  3  Independent with HEP  4  Pt will return to job duties w/out difficulty      Plan  Patient would benefit from: skilled physical therapy  Planned modality interventions: cryotherapy  Planned therapy interventions: functional ROM exercises, flexibility, therapeutic exercise, stretching, strengthening, postural training, patient education, neuromuscular re-education, manual therapy and home exercise program  Frequency: 2x week  Duration in weeks: 12  Treatment plan discussed with: patient        Subjective Evaluation    History of Present Illness  Mechanism of injury: Pt reports on  he slipped on ice at work and injured his R shoulder  He was seeing orthopaedic and underwent MRI which was unremarkable  He attended PT which did not provide any relief  He was scheduled for surgery on 10/8/18 for subacromial decompression, however, torn labrum and biceps was found  He also underwent labral repair and bicep tenodesis  He was given sling to wear for comfort only  He presents today w/ reports of constant pain since surgery  Most pain present once medication wears off  He is very limited w/ daily activity  He was instructed in pendulums, active elbow ROM, and active assisted overhead flexion by MD upon discharge  He is not driving at this time  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  He also would like to fully return to weight lifting, surfing, flag football, baseball  Sleep disturbance reported since surgery  He reports he was instructed to wear sling for comfort only      Pain  At best pain ratin  At worst pain ratin    Patient Goals  Patient goals for therapy: decreased pain, increased motion, increased strength, independence with ADLs/IADLs, return to sport/leisure activities and return to work          Objective     Static Posture     Comments  Forward rounded shoulders    Active Range of Motion   Cervical/Thoracic Spine   Normal active range of motion    Passive Range of Motion   Left Shoulder   Flexion: 175 degrees   Abduction: 175 degrees   External rotation 45°: 80 degrees   Internal rotation 45°: 70 degrees     Right Shoulder   Flexion: 45 degrees   Abduction: 45 degrees   External rotation 45°: 20 degrees     Right Elbow   Flexion: 132 degrees   Extension: 0 degrees       Flowsheet Rows      Most Recent Value   PT/OT G-Codes   Current Score  43   Projected Score  63          Precautions: asthma, protocol    Daily Treatment Diary     Manual  10/10            PROM R shoulder per protocol nv                                                                    Exercise Diary  10/10            pendulums x10            Wrist AROM instructed            Cervical AROM instructed                                                                                                                                                                                                                                             Modalities

## 2018-10-16 ENCOUNTER — OFFICE VISIT (OUTPATIENT)
Dept: PHYSICAL THERAPY | Facility: MEDICAL CENTER | Age: 48
End: 2018-10-16
Payer: OTHER MISCELLANEOUS

## 2018-10-16 DIAGNOSIS — S43.431D TEAR OF RIGHT GLENOID LABRUM, SUBSEQUENT ENCOUNTER: ICD-10-CM

## 2018-10-16 DIAGNOSIS — M75.41 IMPINGEMENT SYNDROME OF RIGHT SHOULDER: Primary | ICD-10-CM

## 2018-10-16 DIAGNOSIS — Z98.890 STATUS POST SUBACROMIAL DECOMPRESSION: ICD-10-CM

## 2018-10-16 PROCEDURE — 97140 MANUAL THERAPY 1/> REGIONS: CPT

## 2018-10-16 PROCEDURE — 97110 THERAPEUTIC EXERCISES: CPT

## 2018-10-16 NOTE — PROGRESS NOTES
Daily Note     Today's date: 10/16/2018  Patient name: Max Oakes  : 1970  MRN: [id number]  Referring provider: Hannah Birch MD  Dx:   Encounter Diagnosis     ICD-10-CM    1  Impingement syndrome of right shoulder M75 41    2  Status post subacromial decompression Z98 890    3  Tear of right glenoid labrum, subsequent encounter S43 935D                   Subjective: Pt reports that his shoulder is feeling sore today having no complaints of pain  Notes that he has been wearing his shoulder sling as much as possible  Objective: See treatment diary below  Precautions: asthma, protocol     Daily Treatment Diary      Manual  10/10  10/16                   PROM R shoulder per protocol nv  15'                                                                                                                         Exercise Diary  10/10  10/16                   pendulums x10  x20                   Wrist AROM instructed  20x ea HEP                   Cervical AROM instructed  10x ea HEP                   Scapular squeezes    :05 x20                   Gripper    Green 20x                                                                                                                                                                                                                                                                                                                                                                                                 Modalities                                                                                                       Assessment: Tolerated treatment well  He was compliant with don/doffing of sling as well as usage  Pt demonstrated compliance with HEP  He was able to reach all protocol limits this visit  Patient demonstrated fatigue post treatment, exhibited good technique with therapeutic exercises and would benefit from continued PT      Plan: Continue per plan of care  Progress treament per protocol

## 2018-10-18 ENCOUNTER — OFFICE VISIT (OUTPATIENT)
Dept: PHYSICAL THERAPY | Facility: MEDICAL CENTER | Age: 48
End: 2018-10-18
Payer: OTHER MISCELLANEOUS

## 2018-10-18 DIAGNOSIS — S43.431D TEAR OF RIGHT GLENOID LABRUM, SUBSEQUENT ENCOUNTER: ICD-10-CM

## 2018-10-18 DIAGNOSIS — Z98.890 STATUS POST SUBACROMIAL DECOMPRESSION: ICD-10-CM

## 2018-10-18 DIAGNOSIS — M75.41 IMPINGEMENT SYNDROME OF RIGHT SHOULDER: Primary | ICD-10-CM

## 2018-10-18 PROCEDURE — 97110 THERAPEUTIC EXERCISES: CPT

## 2018-10-18 PROCEDURE — 97140 MANUAL THERAPY 1/> REGIONS: CPT

## 2018-10-18 NOTE — PROGRESS NOTES
Daily Note     Today's date: 10/18/2018  Patient name: Bernarda White  : 1970  MRN: [id number]  Referring provider: Elke Munoz MD  Dx:   Encounter Diagnosis     ICD-10-CM    1  Impingement syndrome of right shoulder M75 41    2  Status post subacromial decompression Z98 890    3  Tear of right glenoid labrum, subsequent encounter S43 431D                   Subjective: Pt reports that his shoulder is feeling sore today having no complaints of pain  He has been compliant with icing it at home  Objective: See treatment diary below  Precautions: asthma, protocol     Daily Treatment Diary      Manual  10/10  10/16  10/18                 PROM R shoulder per protocol nv  15'  15'                                                                                                                       Exercise Diary  10/10  10/16  10/18                 pendulums x10  x20  x20                 Wrist AROM instructed  20x ea HEP  20x                 Cervical AROM instructed  10x ea HEP  10x                 Scapular squeezes    :05 x20  :05 x20                 Gripper    Green 20x  Green 20x                                                                                                                                                                                                                                                                                                                                                                                               Modalities                                                                                                       Assessment: Tolerated treatment well  He was compliant with don/doffing of sling as well as usage  Pt demonstrated compliance with HEP  He was able to reach all protocol limits this visit   Patient demonstrated fatigue post treatment, exhibited good technique with therapeutic exercises and would benefit from continued PT      Plan: Continue per plan of care  Progress treament per protocol

## 2018-10-23 ENCOUNTER — APPOINTMENT (OUTPATIENT)
Dept: PHYSICAL THERAPY | Facility: MEDICAL CENTER | Age: 48
End: 2018-10-23
Payer: OTHER MISCELLANEOUS

## 2018-10-23 ENCOUNTER — OFFICE VISIT (OUTPATIENT)
Dept: OBGYN CLINIC | Facility: CLINIC | Age: 48
End: 2018-10-23

## 2018-10-23 VITALS
DIASTOLIC BLOOD PRESSURE: 72 MMHG | WEIGHT: 145 LBS | HEIGHT: 66 IN | BODY MASS INDEX: 23.3 KG/M2 | HEART RATE: 82 BPM | SYSTOLIC BLOOD PRESSURE: 116 MMHG

## 2018-10-23 DIAGNOSIS — Z47.89 AFTERCARE FOLLOWING SURGERY OF THE MUSCULOSKELETAL SYSTEM: Primary | ICD-10-CM

## 2018-10-23 PROBLEM — M75.41 IMPINGEMENT SYNDROME OF RIGHT SHOULDER: Status: RESOLVED | Noted: 2018-04-04 | Resolved: 2018-10-23

## 2018-10-23 PROCEDURE — 99024 POSTOP FOLLOW-UP VISIT: CPT | Performed by: ORTHOPAEDIC SURGERY

## 2018-10-23 NOTE — PROGRESS NOTES
Assessment:     1  Aftercare following surgery of the musculoskeletal system        Plan:     Problem List Items Addressed This Visit        Other    Aftercare following surgery of the musculoskeletal system - Primary     Remove sutures  Intraop photos were reviewed with patient  Continue sling  Will continue to limit his work activities  Continue physical therapy for postop rehabilitation  Re-evaluate in 4 weeks  All patient's questions were answered to his satisfaction  This note is created using dictation transcription  It may contain typographical errors, grammatical errors, improperly dictated words, background noise and other errors  Subjective:     Patient ID: Elaine Pabon is a 57 y o  male  Chief Complaint:  61-year-old gentleman follow-up status post right shoulder arthroscopy, subacromial decompression, biceps tenodesis, and distal clavicle resection on 10/8/2018  Patient is doing well  His pain is under good control  Patient has been attending physical therapy with the rehabilitation protocol  Patient does complaining of aching discomfort in the right shoulder  He denies fever, chills, or sweats        Allergy:  No Known Allergies  Medications:  all current active meds have been reviewed  Past Medical History:  Past Medical History:   Diagnosis Date    Asthma     Eczema     PONV (postoperative nausea and vomiting)      Past Surgical History:  Past Surgical History:   Procedure Laterality Date    HERNIA REPAIR      [place] SHLDR ARTHROSCOP,SURG,W/ROTAT CUFF REPR Right 10/8/2018    Procedure: ARTHROSCOPY SHOULDER WITH SUBACROMIAL DECOMPRESSION AND LABRAR REPAIR, BICEP TENOLYSIS;  Surgeon: Kartik Ashby MD;  Location: [facility];  Service: Orthopedics    WISDOM TOOTH EXTRACTION       Family History:  Family History   Problem Relation Age of Onset    No Known Problems Mother     No Known Problems Father      Social History:  History   Alcohol Use    Yes     Comment: 2x month History   Drug Use No     History   Smoking Status    Never Smoker   Smokeless Tobacco    Never Used     Review of Systems   Constitutional: Negative  HENT: Negative  Eyes: Negative  Respiratory: Negative  Cardiovascular: Negative  Gastrointestinal: Negative  Endocrine: Negative  Genitourinary: Negative  Musculoskeletal: Positive for arthralgias (Right shoulder)  Negative for joint swelling  Skin: Negative  Neurological: Negative  Hematological: Negative  Psychiatric/Behavioral: Negative  Objective:  BP Readings from Last 1 Encounters:   10/23/18 116/72      Wt Readings from Last 1 Encounters:   10/23/18 65 8 kg (145 lb)      BMI:   Estimated body mass index is 23 4 kg/m² as calculated from the following:    Height as of this encounter: 5' 6" (1 676 m)  Weight as of this encounter: 65 8 kg (145 lb)  BSA:   Estimated body surface area is 1 74 meters squared as calculated from the following:    Height as of this encounter: 5' 6" (1 676 m)  Weight as of this encounter: 65 8 kg (145 lb)  Physical Exam   Constitutional: He is oriented to person, place, and time  He appears well-developed  HENT:   Head: Normocephalic and atraumatic  Eyes: Conjunctivae and EOM are normal    Neck: Neck supple  Neurological: He is alert and oriented to person, place, and time  Skin: Skin is warm  Psychiatric: He has a normal mood and affect  Nursing note and vitals reviewed  Right Shoulder Exam     Tenderness   The patient is experiencing no tenderness  Muscle Strength   Biceps: 4/5     Tests   Cross Arm: negative  Drop Arm: negative    Other   Erythema: absent  Scars: present (Incisions intact  Healing well    No signs of infection)  Sensation: normal  Pulse: present              Sutures removed

## 2018-10-23 NOTE — ASSESSMENT & PLAN NOTE
Remove sutures  Intraop photos were reviewed with patient  Continue sling  Will continue to limit his work activities  Continue physical therapy for postop rehabilitation  Re-evaluate in 4 weeks  All patient's questions were answered to his satisfaction  This note is created using dictation transcription  It may contain typographical errors, grammatical errors, improperly dictated words, background noise and other errors

## 2018-10-24 ENCOUNTER — TELEPHONE (OUTPATIENT)
Dept: OBGYN CLINIC | Facility: HOSPITAL | Age: 48
End: 2018-10-24

## 2018-10-24 DIAGNOSIS — M75.41 IMPINGEMENT SYNDROME OF RIGHT SHOULDER: ICD-10-CM

## 2018-10-24 RX ORDER — CYCLOBENZAPRINE HCL 10 MG
10 TABLET ORAL
Qty: 30 TABLET | Refills: 0 | Status: SHIPPED | OUTPATIENT
Start: 2018-10-24 | End: 2021-04-28

## 2018-10-24 NOTE — TELEPHONE ENCOUNTER
Patient given above information via voicemail  Advised that patient should call the office if there is anything else he requires

## 2018-10-24 NOTE — TELEPHONE ENCOUNTER
Tanvi Jenny  [phone]    Dr Kyra Louise,    Patient needs 2 RX refilled, has none left, he is still taking both of them   Pharmacy has changed to [id number]    cyclobenzaprine (FLEXERIL) 10 mg tablet [[id number]]   Order Details   Dose: 10 mg Route: Oral Frequency: 3 times daily PRN for muscle spasms   Dispense Quantity:  30 tablet Refills:  0 Fills remaining:  --           Sig: Take 1 tablet (10 mg total) by mouth 3 (three) times a day as needed for muscle spasms               ketorolac (TORADOL) 10 mg tablet [[id number]]   Order Details   Dose: 10 mg Route: Oral Frequency: Every 8 hours   Dispense Quantity:  15 tablet Refills:  0 Fills remaining:  --           Sig: Take 1 tablet (10 mg total) by mouth every 8 (eight) hours

## 2018-10-24 NOTE — TELEPHONE ENCOUNTER
He does not need to Toradol anymore  He can change it to over the counter Aleve  The flexeril should be only take it at night time for spasm

## 2018-10-25 ENCOUNTER — OFFICE VISIT (OUTPATIENT)
Dept: PHYSICAL THERAPY | Facility: MEDICAL CENTER | Age: 48
End: 2018-10-25
Payer: OTHER MISCELLANEOUS

## 2018-10-25 DIAGNOSIS — Z98.890 STATUS POST SUBACROMIAL DECOMPRESSION: ICD-10-CM

## 2018-10-25 DIAGNOSIS — M75.41 IMPINGEMENT SYNDROME OF RIGHT SHOULDER: Primary | ICD-10-CM

## 2018-10-25 DIAGNOSIS — S43.431D TEAR OF RIGHT GLENOID LABRUM, SUBSEQUENT ENCOUNTER: ICD-10-CM

## 2018-10-25 PROCEDURE — 97110 THERAPEUTIC EXERCISES: CPT | Performed by: PHYSICAL THERAPIST

## 2018-10-25 PROCEDURE — 97140 MANUAL THERAPY 1/> REGIONS: CPT | Performed by: PHYSICAL THERAPIST

## 2018-10-25 NOTE — PROGRESS NOTES
Daily Note     Today's date: 10/25/2018  Patient name: Alvaro Larkin  : 1970  MRN: [id number]  Referring provider: Amador Villarreal MD  Dx:   Encounter Diagnosis     ICD-10-CM    1  Impingement syndrome of right shoulder M75 41    2  Status post subacromial decompression Z98 890    3  Tear of right glenoid labrum, subsequent encounter S43 476D                   Subjective: Pt reports feeling "scared" earlier this week when he experienced increased pain  Reports compliance w/ wearing sling and following restrictions in place by MD   Since then, pain has decreased and he is taking Motrin more regularly  Objective: See treatment diary below  Precautions: asthma, protocol     Daily Treatment Diary      Manual  10/10  10/16  10/18  10/25               PROM R shoulder per protocol nv  15'  15'  15 min                                                                                                                     Exercise Diary  10/10  10/16  10/18  10/25               pendulums x10  x20  x20  x20               Wrist AROM instructed  20x ea HEP  20x  x20               Cervical AROM instructed  10x ea HEP  10x  x10               Scapular squeezes    :05 x20  :05 x20  5"x20               Gripper    Green 20x  Green 20x  green x20                                                                                                                                                                                                                                                                                                                                                                                             Modalities                                                                                                       Assessment: Tolerated treatment well    Patient demonstrated fatigue post treatment, exhibited good technique with therapeutic exercises and would benefit from continued PT  PROM within protocol limits  Plan: Continue per plan of care  Progress treament per protocol

## 2018-10-30 ENCOUNTER — OFFICE VISIT (OUTPATIENT)
Dept: PHYSICAL THERAPY | Facility: MEDICAL CENTER | Age: 48
End: 2018-10-30
Payer: OTHER MISCELLANEOUS

## 2018-10-30 DIAGNOSIS — M75.41 IMPINGEMENT SYNDROME OF RIGHT SHOULDER: Primary | ICD-10-CM

## 2018-10-30 DIAGNOSIS — Z98.890 STATUS POST SUBACROMIAL DECOMPRESSION: ICD-10-CM

## 2018-10-30 DIAGNOSIS — S43.431D TEAR OF RIGHT GLENOID LABRUM, SUBSEQUENT ENCOUNTER: ICD-10-CM

## 2018-10-30 PROCEDURE — 97110 THERAPEUTIC EXERCISES: CPT

## 2018-10-30 PROCEDURE — 97140 MANUAL THERAPY 1/> REGIONS: CPT

## 2018-10-30 NOTE — PROGRESS NOTES
Daily Note     Today's date: 10/30/2018  Patient name: Bernarda White  : 1970  MRN: [id number]  Referring provider: Elke Munoz MD  Dx:   Encounter Diagnosis     ICD-10-CM    1  Impingement syndrome of right shoulder M75 41    2  Status post subacromial decompression Z98 890    3  Tear of right glenoid labrum, subsequent encounter S43 931D                   Subjective: Pt reported mild soreness in shoulder today  Objective: See treatment diary below  Precautions: asthma, protocol     Daily Treatment Diary      Manual  10/10  10/16  10/18  10/25  10/30             PROM R shoulder per protocol nv  15'  15'  15 min  15 min                                                                                                                   Exercise Diary  10/10  10/16  10/18  10/25  10/30             pendulums x10  x20  x20  x20  20x             Wrist AROM instructed  20x ea HEP  20x  x20  20x             Cervical AROM instructed  10x ea HEP  10x  x10  10x             Scapular squeezes    :05 x20  :05 x20  5"x20  5"x20             Gripper    Green 20x  Green 20x  green x20  green x20                                                                                                                                                                                                                                                                                                                                                                                           Modalities                                                                                                       Assessment: Tolerated treatment well  Patient demonstrated fatigue post treatment, exhibited good technique with therapeutic exercises and would benefit from continued PT  He states "i dont feel ready for anything else right now"  Plan to progress w/ protocol next week if able  Plan: Continue per plan of care  Progress treament per protocol

## 2018-11-01 ENCOUNTER — APPOINTMENT (OUTPATIENT)
Dept: PHYSICAL THERAPY | Facility: MEDICAL CENTER | Age: 48
End: 2018-11-01
Payer: OTHER MISCELLANEOUS

## 2018-11-06 ENCOUNTER — OFFICE VISIT (OUTPATIENT)
Dept: PHYSICAL THERAPY | Facility: MEDICAL CENTER | Age: 48
End: 2018-11-06
Payer: OTHER MISCELLANEOUS

## 2018-11-06 DIAGNOSIS — Z98.890 STATUS POST SUBACROMIAL DECOMPRESSION: ICD-10-CM

## 2018-11-06 DIAGNOSIS — M75.41 IMPINGEMENT SYNDROME OF RIGHT SHOULDER: Primary | ICD-10-CM

## 2018-11-06 DIAGNOSIS — S43.431D TEAR OF RIGHT GLENOID LABRUM, SUBSEQUENT ENCOUNTER: ICD-10-CM

## 2018-11-06 PROCEDURE — 97110 THERAPEUTIC EXERCISES: CPT

## 2018-11-06 PROCEDURE — 97140 MANUAL THERAPY 1/> REGIONS: CPT

## 2018-11-06 NOTE — PROGRESS NOTES
Daily Note     Today's date: 2018  Patient name: May Reddy  : 1970  MRN: [id number]  Referring provider: Sharlene Mckeon MD  Dx:   Encounter Diagnosis     ICD-10-CM    1  Impingement syndrome of right shoulder M75 41    2  Status post subacromial decompression Z98 890    3  Tear of right glenoid labrum, subsequent encounter S43 431D                   Subjective: Pt d/c sling as of today  He notes reaching forward tends to bother him and create a "burning" sensation in posterior shoulder/scapular region  Objective: See treatment diary below  Precautions: asthma, protocol     Daily Treatment Diary      Manual  10/10  10/16  10/18  10/25  10/30  11/6           PROM R shoulder per protocol nv  15'  15'  15 min  15 min  15 min                                                                                                                 Exercise Diary  10/10  10/16  10/18  10/25  10/30 11/6           pendulums x10  x20  x20  x20  20x 20x           Wrist AROM instructed  20x ea HEP  20x  x20  20x  20x           Cervical AROM instructed  10x ea HEP  10x  x10  10x  10x           Scapular squeezes    :05 x20  :05 x20  5"x20  5"x20 5"x20            Gripper    Green 20x  Green 20x  green x20  green x20  green x20           Table Slides flex            10"x10            table slides scapion            10"x10            table slides ER           10"x10                                                                                                                                                                                                                                                                                                                 Modalities                                                                                                       Assessment: Tolerated treatment well    Patient demonstrated fatigue post treatment, exhibited good technique with therapeutic exercises and would benefit from continued PT  Progressed pt was table slides today w/ fair tolerance  He c/o "discomfort" at end range  Emphasized importance of performing to tolerance  Plan: Continue per plan of care  Progress treament per protocol

## 2018-11-08 ENCOUNTER — OFFICE VISIT (OUTPATIENT)
Dept: PHYSICAL THERAPY | Facility: MEDICAL CENTER | Age: 48
End: 2018-11-08
Payer: OTHER MISCELLANEOUS

## 2018-11-08 DIAGNOSIS — S43.431D TEAR OF RIGHT GLENOID LABRUM, SUBSEQUENT ENCOUNTER: ICD-10-CM

## 2018-11-08 DIAGNOSIS — Z98.890 STATUS POST SUBACROMIAL DECOMPRESSION: ICD-10-CM

## 2018-11-08 DIAGNOSIS — M75.41 IMPINGEMENT SYNDROME OF RIGHT SHOULDER: Primary | ICD-10-CM

## 2018-11-08 PROCEDURE — 97140 MANUAL THERAPY 1/> REGIONS: CPT

## 2018-11-08 PROCEDURE — 97110 THERAPEUTIC EXERCISES: CPT

## 2018-11-08 NOTE — PROGRESS NOTES
Daily Note     Today's date: 2018  Patient name: Bradly Baumgarten  : 1970  MRN: [id number]  Referring provider: Gustavo Gerard MD  Dx:   Encounter Diagnosis     ICD-10-CM    1  Impingement syndrome of right shoulder M75 41    2  Status post subacromial decompression Z98 890    3  Tear of right glenoid labrum, subsequent encounter S43 218D                   Subjective: Pt reports shoulder feels sore from not being in sling anymore  Objective: See treatment diary below  Precautions: asthma, protocol     Daily Treatment Diary      Manual                  PROM R shoulder per protocol 15 mins                                                                                                                      Exercise Diary                 pendulums np               Wrist AROM np               Cervical AROM np                Scapular squeezes  np                Gripper  blue x20                Table Slides flex  10"x10                 table slides scapion  10"x10                 table slides ER  10"x1                  Pullies   nv                    Wand ER    nv                                                                                                                                                                                                                                                                         Modalities                                                                                                       Assessment: Tolerated treatment well  Patient demonstrated fatigue post treatment, exhibited good technique with therapeutic exercises and would benefit from continued PT  Did not progress pt today secondary to higher pain levels  Plan to do so nv  Plan: Continue per plan of care  Progress treament per protocol

## 2018-11-13 ENCOUNTER — OFFICE VISIT (OUTPATIENT)
Dept: PHYSICAL THERAPY | Facility: MEDICAL CENTER | Age: 48
End: 2018-11-13
Payer: OTHER MISCELLANEOUS

## 2018-11-13 DIAGNOSIS — Z47.89 AFTERCARE FOLLOWING SURGERY OF THE MUSCULOSKELETAL SYSTEM: ICD-10-CM

## 2018-11-13 DIAGNOSIS — S43.431D TEAR OF RIGHT GLENOID LABRUM, SUBSEQUENT ENCOUNTER: ICD-10-CM

## 2018-11-13 DIAGNOSIS — Z98.890 STATUS POST SUBACROMIAL DECOMPRESSION: ICD-10-CM

## 2018-11-13 DIAGNOSIS — M75.41 IMPINGEMENT SYNDROME OF RIGHT SHOULDER: Primary | ICD-10-CM

## 2018-11-13 PROCEDURE — 97140 MANUAL THERAPY 1/> REGIONS: CPT

## 2018-11-13 PROCEDURE — 97110 THERAPEUTIC EXERCISES: CPT

## 2018-11-13 NOTE — PROGRESS NOTES
Daily Note     Today's date: 2018  Patient name: Juliet Gomez  : 1970  MRN: [id number]  Referring provider: Isabella Forbes MD  Dx:   Encounter Diagnosis     ICD-10-CM    1  Impingement syndrome of right shoulder M75 41    2  Status post subacromial decompression Z98 890    3  Tear of right glenoid labrum, subsequent encounter S43 431D    4  Aftercare following surgery of the musculoskeletal system Z47 89                   Subjective: Pt reports soreness in mild soreness in shoulder  Objective: See treatment diary below  Precautions: asthma, protocol     Daily Treatment Diary      Manual                 PROM R shoulder per protocol 15 mins 15 mins                                                                                                                     Exercise Diary                pendulums np np              Wrist AROM np np              Cervical AROM np np               Scapular squeezes  np np               Gripper  blue x20 Blue x20               Table Slides flex  10"x10 10"x10                table slides scapion  10"x10 10"x10                table slides ER  10"x1 10"x10                 Pullies   5 mins                    Wand ER    10"x10                                                                                                                                                                                                                                                                         Modalities                                                                                                       Assessment: Tolerated treatment well  Patient demonstrated fatigue post treatment, exhibited good technique with therapeutic exercises and would benefit from continued PT  Progressed pt today w/ protocol  He had most discomfort w/ seated ER  Plan: Continue per plan of care  Progress treament per protocol

## 2018-11-15 ENCOUNTER — APPOINTMENT (OUTPATIENT)
Dept: PHYSICAL THERAPY | Facility: MEDICAL CENTER | Age: 48
End: 2018-11-15
Payer: OTHER MISCELLANEOUS

## 2018-11-20 ENCOUNTER — EVALUATION (OUTPATIENT)
Dept: PHYSICAL THERAPY | Facility: MEDICAL CENTER | Age: 48
End: 2018-11-20
Payer: OTHER MISCELLANEOUS

## 2018-11-20 DIAGNOSIS — S43.431D TEAR OF RIGHT GLENOID LABRUM, SUBSEQUENT ENCOUNTER: ICD-10-CM

## 2018-11-20 DIAGNOSIS — M75.41 IMPINGEMENT SYNDROME OF RIGHT SHOULDER: Primary | ICD-10-CM

## 2018-11-20 DIAGNOSIS — Z98.890 STATUS POST SUBACROMIAL DECOMPRESSION: ICD-10-CM

## 2018-11-20 DIAGNOSIS — Z47.89 AFTERCARE FOLLOWING SURGERY OF THE MUSCULOSKELETAL SYSTEM: ICD-10-CM

## 2018-11-20 PROCEDURE — G8991 OTHER PT/OT GOAL STATUS: HCPCS | Performed by: PHYSICAL THERAPIST

## 2018-11-20 PROCEDURE — G8990 OTHER PT/OT CURRENT STATUS: HCPCS | Performed by: PHYSICAL THERAPIST

## 2018-11-20 PROCEDURE — 97140 MANUAL THERAPY 1/> REGIONS: CPT | Performed by: PHYSICAL THERAPIST

## 2018-11-20 PROCEDURE — 97110 THERAPEUTIC EXERCISES: CPT | Performed by: PHYSICAL THERAPIST

## 2018-11-20 NOTE — PROGRESS NOTES
PT Re-Evaluation     Today's date: 2018  Patient name: Marilynn Tsai  : 1970  MRN: [id number]  Referring provider: Diamond Hoskins MD  Dx:   Encounter Diagnosis     ICD-10-CM    1  Impingement syndrome of right shoulder M75 41    2  Status post subacromial decompression Z98 890    3  Tear of right glenoid labrum, subsequent encounter S43 431D    4  Aftercare following surgery of the musculoskeletal system Z47 89                   Assessment  Impairments: abnormal or restricted ROM, activity intolerance, impaired physical strength, lacks appropriate home exercise program, pain with function and poor posture     Assessment details: Marilynn Tsai has attended 9 visits since initiating PT and has demonstrated overall improvement  Mobility and strength has improved with decreased reports of pain  Marilynn Tsai has demonstrated an improvement in function as well  Currently, he has made steady progress towards his goals, but continue to remain limited with reaching, lifting, and sudden movements  At this time, continued physical therapy is recommended in order to address their remaining impairments in effort to further improve function progressing w/ MD protocol  Understanding of Dx/Px/POC: good   Prognosis: good    Goals  Short Term Goals:  1  Pain decreased 3 subjective ratings in 6 weeks PARTIALLY MET  2  PROM within normal limits in 6 weeks PARTIALLY MET    Long Term Goals:  1  AROM within normal limits in 12 weeks PARTIALLY MET  2  Patient will be independent with IADLS/ADLS PARTIALLY MET  3  Independent with HEP PARTIALLY MET  4  Pt will return to job duties w/out difficulty PARTIALLY MET      Plan  Patient would benefit from: skilled physical therapy  Planned modality interventions: cryotherapy  Planned therapy interventions: functional ROM exercises, flexibility, therapeutic exercise, stretching, strengthening, postural training, patient education, neuromuscular re-education, manual therapy and home exercise program  Frequency: 2x week  Duration in weeks: 12  Treatment plan discussed with: patient        Subjective Evaluation    History of Present Illness  Mechanism of injury: Pt reports continued improvement, having good days and bad days  Most pain occurs w/ sudden movements, reaching  Intermittent sleep disturbance persists  Has returned to work on light duty; avoids all lifting, physical labor  Continues to avoid UE lifting, working out    Pain  At best pain ratin  At worst pain ratin    Patient Goals  Patient goals for therapy: decreased pain, increased motion, increased strength, independence with ADLs/IADLs, return to sport/leisure activities and return to work          Objective     Active Range of Motion   Cervical/Thoracic Spine   Normal active range of motion    Right Shoulder   Flexion: 118 degrees   Abduction: 115 degrees     Passive Range of Motion     Right Shoulder   Flexion: 152 degrees   Abduction: 152 degrees   External rotation 45°: 45 degrees   Internal rotation 45°: 45 degrees     Right Elbow   Flexion: 132 degrees   Extension: 0 degrees           Precautions: asthma, protocol    Daily Treatment Diary     Manual              PROM R shoulder per protocol 15 min                                                                    Exercise Diary              pulleys 5 min            Table slides flex 10"x 10            Table slides scap 10"x 10            Table slides ER 10"x 10            Wand ER 10"x 10            Wand flexion 10"x 10            Wand scaption 10"x 10            Wall slides nv            tband rows nv                                                                                                                                                               Modalities

## 2018-11-27 ENCOUNTER — OFFICE VISIT (OUTPATIENT)
Dept: OBGYN CLINIC | Facility: CLINIC | Age: 48
End: 2018-11-27

## 2018-11-27 VITALS
WEIGHT: 151 LBS | HEIGHT: 66 IN | DIASTOLIC BLOOD PRESSURE: 70 MMHG | SYSTOLIC BLOOD PRESSURE: 118 MMHG | HEART RATE: 78 BPM | BODY MASS INDEX: 24.27 KG/M2

## 2018-11-27 DIAGNOSIS — Z47.89 AFTERCARE FOLLOWING SURGERY OF THE MUSCULOSKELETAL SYSTEM: Primary | ICD-10-CM

## 2018-11-27 PROCEDURE — 99024 POSTOP FOLLOW-UP VISIT: CPT | Performed by: ORTHOPAEDIC SURGERY

## 2018-11-27 NOTE — PROGRESS NOTES
Assessment:     1  Aftercare following surgery of the musculoskeletal system        Plan:     Problem List Items Addressed This Visit        Other    Aftercare following surgery of the musculoskeletal system - Primary     Status post biceps tenodesis, distal clavicle resection, and subacromial decompression  Patient is doing well  Patient will continue physical therapy to rehabilitate his shoulder  We will continue to limit his work activities with his right arm  Re-evaluate in 4 weeks  All patient's questions were answered to his satisfaction  This note is created using dictation transcription  It may contain typographical errors, grammatical errors, improperly dictated words, background noise and other errors  Subjective:     Patient ID: Juliet Gomez is a 57 y o  male  Chief Complaint:  27-year-old gentleman follow-up status post right shoulder arthroscopy, subacromial decompression, biceps tenodesis, and distal clavicle resection on 10/8/2018  Patient is doing well  Patient does not have the pre-surgical pain any more  She does feel tightness mostly in the back of her his shoulder  He has been attending physical therapy and doing well  His motion and strength is slowly improving        Allergy:  No Known Allergies  Medications:  all current active meds have been reviewed  Past Medical History:  Past Medical History:   Diagnosis Date    Asthma     Eczema     PONV (postoperative nausea and vomiting)      Past Surgical History:  Past Surgical History:   Procedure Laterality Date    HERNIA REPAIR      MD SHLDR ARTHROSCOP,SURG,W/ROTAT CUFF REPR Right 10/8/2018    Procedure: ARTHROSCOPY SHOULDER WITH SUBACROMIAL DECOMPRESSION AND LABRAR REPAIR, BICEP TENOLYSIS;  Surgeon: Марина Gordon MD;  Location: [facility] OR;  Service: Orthopedics    WISDOM TOOTH EXTRACTION       Family History:  Family History   Problem Relation Age of Onset    No Known Problems Mother     No Known Problems Father Social History:  History   Alcohol Use    Yes     Comment: 2x month     History   Drug Use No     History   Smoking Status    Never Smoker   Smokeless Tobacco    Never Used     Review of Systems   Constitutional: Negative  HENT: Negative  Eyes: Negative  Respiratory: Negative  Cardiovascular: Negative  Gastrointestinal: Negative  Endocrine: Negative  Genitourinary: Negative  Musculoskeletal: Negative for arthralgias (Right shoulder) and joint swelling  Skin: Negative  Neurological: Negative  Hematological: Negative  Psychiatric/Behavioral: Negative  Objective:  BP Readings from Last 1 Encounters:   11/27/18 118/70      Wt Readings from Last 1 Encounters:   11/27/18 68 5 kg (151 lb)      BMI:   Estimated body mass index is 24 37 kg/m² as calculated from the following:    Height as of this encounter: 5' 6" (1 676 m)  Weight as of this encounter: 68 5 kg (151 lb)  BSA:   Estimated body surface area is 1 78 meters squared as calculated from the following:    Height as of this encounter: 5' 6" (1 676 m)  Weight as of this encounter: 68 5 kg (151 lb)  Physical Exam   Constitutional: He is oriented to person, place, and time  He appears well-developed  HENT:   Head: Normocephalic and atraumatic  Eyes: Conjunctivae and EOM are normal    Neck: Neck supple  Pulmonary/Chest: Effort normal    Neurological: He is alert and oriented to person, place, and time  Skin: Skin is warm  Psychiatric: He has a normal mood and affect  Nursing note and vitals reviewed  Right Shoulder Exam     Tenderness   The patient is experiencing no tenderness          Range of Motion   Forward Flexion: 170   Internal Rotation 0 degrees: L2     Muscle Strength   Abduction: 4/5   Internal Rotation: 5/5   External Rotation: 5/5   Supraspinatus: 4/5   Subscapularis: 5/5   Biceps: 4/5     Tests   Apprehension: negative  Cross Arm: negative  Drop Arm: negative  Impingement: negative    Other   Erythema: absent  Scars: present (Incisions intact  Healing well    No signs of infection)  Sensation: normal  Pulse: present              Sutures removed

## 2018-11-27 NOTE — ASSESSMENT & PLAN NOTE
Status post biceps tenodesis, distal clavicle resection, and subacromial decompression  Patient is doing well  Patient will continue physical therapy to rehabilitate his shoulder  We will continue to limit his work activities with his right arm  Re-evaluate in 4 weeks  All patient's questions were answered to his satisfaction  This note is created using dictation transcription  It may contain typographical errors, grammatical errors, improperly dictated words, background noise and other errors

## 2018-11-29 ENCOUNTER — OFFICE VISIT (OUTPATIENT)
Dept: PHYSICAL THERAPY | Facility: MEDICAL CENTER | Age: 48
End: 2018-11-29
Payer: OTHER MISCELLANEOUS

## 2018-11-29 DIAGNOSIS — Z98.890 STATUS POST SUBACROMIAL DECOMPRESSION: ICD-10-CM

## 2018-11-29 DIAGNOSIS — Z47.89 AFTERCARE FOLLOWING SURGERY OF THE MUSCULOSKELETAL SYSTEM: ICD-10-CM

## 2018-11-29 DIAGNOSIS — M75.41 IMPINGEMENT SYNDROME OF RIGHT SHOULDER: Primary | ICD-10-CM

## 2018-11-29 DIAGNOSIS — S43.431D TEAR OF RIGHT GLENOID LABRUM, SUBSEQUENT ENCOUNTER: ICD-10-CM

## 2018-11-29 PROCEDURE — 97140 MANUAL THERAPY 1/> REGIONS: CPT

## 2018-11-29 PROCEDURE — 97110 THERAPEUTIC EXERCISES: CPT

## 2018-11-29 NOTE — PROGRESS NOTES
Daily Note     Today's date: 2018  Patient name: Taqueria Maciel  : 1970  MRN: [id number]  Referring provider: Lucia Morrison MD  Dx:   Encounter Diagnosis     ICD-10-CM    1  Impingement syndrome of right shoulder M75 41    2  Status post subacromial decompression Z98 890    3  Tear of right glenoid labrum, subsequent encounter S43 431D    4  Aftercare following surgery of the musculoskeletal system Z47 89                   Subjective: Pt notes overall he is doing better but he had some soreness after he saw the doc and he did some testing  Objective: See treatment diary below      Precautions: asthma, protocol    Daily Treatment Diary     Manual             PROM R shoulder per protocol 15 min 15 mins                                                                   Exercise Diary             pulleys 5 min 5 mins           Table slides flex 10"x 10 10"x10           Table slides scap 10"x 10 10"x10           Table slides ER 10"x 10 10"x10           Wand ER 10"x 10 10"x10           Wand flexion 10"x 10 10"x10           Wand scaption 10"x 10 10"x10           Wall slides nv 10"x10           tband rows nv 5"x20 RTB                                                                                                                                                              Modalities                                                           Assessment: Tolerated treatment well  Patient demonstrated fatigue post treatment and would benefit from continued PT  Progressed pt w/ TE as charted  Good form noted throughout  Plan: Continue per plan of care

## 2018-12-04 ENCOUNTER — OFFICE VISIT (OUTPATIENT)
Dept: PHYSICAL THERAPY | Facility: MEDICAL CENTER | Age: 48
End: 2018-12-04
Payer: OTHER MISCELLANEOUS

## 2018-12-04 DIAGNOSIS — Z98.890 STATUS POST SUBACROMIAL DECOMPRESSION: ICD-10-CM

## 2018-12-04 DIAGNOSIS — Z47.89 AFTERCARE FOLLOWING SURGERY OF THE MUSCULOSKELETAL SYSTEM: ICD-10-CM

## 2018-12-04 DIAGNOSIS — S43.431D TEAR OF RIGHT GLENOID LABRUM, SUBSEQUENT ENCOUNTER: ICD-10-CM

## 2018-12-04 DIAGNOSIS — M75.41 IMPINGEMENT SYNDROME OF RIGHT SHOULDER: Primary | ICD-10-CM

## 2018-12-04 PROCEDURE — 97110 THERAPEUTIC EXERCISES: CPT

## 2018-12-04 PROCEDURE — 97140 MANUAL THERAPY 1/> REGIONS: CPT

## 2018-12-04 NOTE — PROGRESS NOTES
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 which took about 1 hour today  Objective: See treatment diary below      Precautions: asthma, protocol    Daily Treatment Diary     Manual            PROM R shoulder per protocol 15 min 15 mins 15 min                                                                  Exercise Diary            pulleys 5 min 5 mins 5 mins          Table slides flex 10"x 10 10"x10 10"x10          Table slides scap 10"x 10 10"x10 10"x10          Table slides ER 10"x 10 10"x10 10"x1          Wand ER 10"x 10 10"x10 10"x10          Wand flexion 10"x 10 10"x10 10"x10          Wand scaption 10"x 10 10"x10 10"x10          Wall slides nv 10"x10 10"x10          tband rows nv 5"x20 RTB 5"x20 RTB                                                                                                                                                             Modalities                                                           Assessment: Tolerated treatment well  Patient demonstrated fatigue post treatment and would benefit from continued PT  Did not progress pt this date secondary to soreness from work today  Sig scapular winging w/ shoulder flexion  Pt also reported most soreness in R  Lat region today  Tightness noted w/ scap stabilization into shoulder flexion w/ good tolerance  Plan: Continue per plan of care

## 2018-12-06 ENCOUNTER — OFFICE VISIT (OUTPATIENT)
Dept: PHYSICAL THERAPY | Facility: MEDICAL CENTER | Age: 48
End: 2018-12-06
Payer: OTHER MISCELLANEOUS

## 2018-12-06 DIAGNOSIS — M75.41 IMPINGEMENT SYNDROME OF RIGHT SHOULDER: Primary | ICD-10-CM

## 2018-12-06 DIAGNOSIS — Z98.890 STATUS POST SUBACROMIAL DECOMPRESSION: ICD-10-CM

## 2018-12-06 DIAGNOSIS — S43.431D TEAR OF RIGHT GLENOID LABRUM, SUBSEQUENT ENCOUNTER: ICD-10-CM

## 2018-12-06 PROCEDURE — 97140 MANUAL THERAPY 1/> REGIONS: CPT | Performed by: PHYSICAL THERAPIST

## 2018-12-06 PROCEDURE — 97110 THERAPEUTIC EXERCISES: CPT | Performed by: PHYSICAL THERAPIST

## 2018-12-06 PROCEDURE — 97112 NEUROMUSCULAR REEDUCATION: CPT | Performed by: PHYSICAL THERAPIST

## 2018-12-06 NOTE — PROGRESS NOTES
Daily Note     Today's date: 2018  Patient name: Dean Almeida  : 1970  MRN: [id number]  Referring provider: Svetlana Trivedi MD  Dx:   Encounter Diagnosis     ICD-10-CM    1  Impingement syndrome of right shoulder M75 41    2  Status post subacromial decompression Z98 890    3  Tear of right glenoid labrum, subsequent encounter S43 431D                   Subjective: Pt reports continued soreness from UE overuse  Relief following stretching last visit, that was only temporary  Objective: See treatment diary below      Precautions: asthma, protocol    Daily Treatment Diary     Manual           PROM R shoulder per protocol 15 min 15 mins 15 min 10 min w/ scap stab         STM/TPR R lat/teres    5 min                                                    Exercise Diary           pulleys 5 min 5 mins 5 mins 5 min         Table slides flex 10"x 10 10"x10 10"x10 10" x10         Table slides scap 10"x 10 10"x10 10"x10 10" x10         Table slides ER 10"x 10 10"x10 10"x1 10" x10         Wand ER 10"x 10 10"x10 10"x10 10" x10         Wand flexion 10"x 10 10"x10 10"x10 10" x10         Wand scaption 10"x 10 10"x10 10"x10 10"x 10         Wall slides nv 10"x10 10"x10 10" x10         tband rows nv 5"x20 RTB 5"x20 RTB RTB 5"x20                                                                                                                                                            Modalities                                                           Assessment: Tolerated treatment well  Patient demonstrated fatigue post treatment and would benefit from continued PT  Improved stretch felt w/ scap stabilization; added STM/TPR today w/ good tolerance  Assess response nv and if positive consider adding graston  Plan: Continue per plan of care

## 2018-12-11 ENCOUNTER — APPOINTMENT (OUTPATIENT)
Dept: PHYSICAL THERAPY | Facility: MEDICAL CENTER | Age: 48
End: 2018-12-11
Payer: OTHER MISCELLANEOUS

## 2018-12-13 ENCOUNTER — OFFICE VISIT (OUTPATIENT)
Dept: PHYSICAL THERAPY | Facility: MEDICAL CENTER | Age: 48
End: 2018-12-13
Payer: OTHER MISCELLANEOUS

## 2018-12-13 DIAGNOSIS — Z47.89 AFTERCARE FOLLOWING SURGERY OF THE MUSCULOSKELETAL SYSTEM: ICD-10-CM

## 2018-12-13 DIAGNOSIS — Z98.890 STATUS POST SUBACROMIAL DECOMPRESSION: ICD-10-CM

## 2018-12-13 DIAGNOSIS — M75.41 IMPINGEMENT SYNDROME OF RIGHT SHOULDER: Primary | ICD-10-CM

## 2018-12-13 DIAGNOSIS — S43.431D TEAR OF RIGHT GLENOID LABRUM, SUBSEQUENT ENCOUNTER: ICD-10-CM

## 2018-12-13 PROCEDURE — 97140 MANUAL THERAPY 1/> REGIONS: CPT

## 2018-12-13 PROCEDURE — 97110 THERAPEUTIC EXERCISES: CPT

## 2018-12-13 NOTE — PROGRESS NOTES
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  Objective: See treatment diary below      Precautions: asthma, protocol    Daily Treatment Diary     Manual          PROM R shoulder per protocol 15 min 15 mins 15 min 10 min w/ scap stab 10 min        STM/TPR R lat/teres    5 min 5 mins                                                   Exercise Diary          pulleys 5 min 5 mins 5 mins 5 min 5 min        Table slides flex 10"x 10 10"x10 10"x10 10" x10 10"x10        Table slides scap 10"x 10 10"x10 10"x10 10" x10 10"x10        Table slides ER 10"x 10 10"x10 10"x1 10" x10 10"x10        Wand ER 10"x 10 10"x10 10"x10 10" x10 10"x10        Wand flexion 10"x 10 10"x10 10"x10 10" x10 10"x10        Wand scaption 10"x 10 10"x10 10"x10 10"x 10 10"x10        Wall slides nv 10"x10 10"x10 10" x10 10"x10        tband rows nv 5"x20 RTB 5"x20 RTB RTB 5"x20 GTB 5"x20                                                                                                                                                           Modalities                                                           Assessment: Tolerated treatment well  Patient demonstrated fatigue post treatment and would benefit from continued PT  Good relief noted w/ STM to lat region  Improvement noted w/ shoulder flexion and decreased scapular winging  Plan: Continue per plan of care

## 2018-12-18 ENCOUNTER — APPOINTMENT (OUTPATIENT)
Dept: PHYSICAL THERAPY | Facility: MEDICAL CENTER | Age: 48
End: 2018-12-18
Payer: OTHER MISCELLANEOUS

## 2018-12-20 ENCOUNTER — OFFICE VISIT (OUTPATIENT)
Dept: PHYSICAL THERAPY | Facility: MEDICAL CENTER | Age: 48
End: 2018-12-20
Payer: OTHER MISCELLANEOUS

## 2018-12-20 DIAGNOSIS — S43.431D TEAR OF RIGHT GLENOID LABRUM, SUBSEQUENT ENCOUNTER: ICD-10-CM

## 2018-12-20 DIAGNOSIS — Z98.890 STATUS POST SUBACROMIAL DECOMPRESSION: ICD-10-CM

## 2018-12-20 DIAGNOSIS — M75.41 IMPINGEMENT SYNDROME OF RIGHT SHOULDER: Primary | ICD-10-CM

## 2018-12-20 DIAGNOSIS — Z47.89 AFTERCARE FOLLOWING SURGERY OF THE MUSCULOSKELETAL SYSTEM: ICD-10-CM

## 2018-12-20 PROCEDURE — 97110 THERAPEUTIC EXERCISES: CPT | Performed by: PHYSICAL THERAPIST

## 2018-12-20 PROCEDURE — 97140 MANUAL THERAPY 1/> REGIONS: CPT | Performed by: PHYSICAL THERAPIST

## 2018-12-20 NOTE — PROGRESS NOTES
Daily Note     Today's date: 2018  Patient name: Arpit Sheldon  : 1970  MRN: [id number]  Referring provider: Senthil Blevins MD  Dx:   Encounter Diagnosis     ICD-10-CM    1  Impingement syndrome of right shoulder M75 41    2  Status post subacromial decompression Z98 890    3  Tear of right glenoid labrum, subsequent encounter S43 431D    4  Aftercare following surgery of the musculoskeletal system Z47 89                   Subjective: Pt reports persistent soreness  Objective: See treatment diary below      Precautions: asthma, protocol    Daily Treatment Diary     Manual         PROM R shoulder per protocol 15 min 15 mins 15 min 10 min w/ scap stab 10 min 10 min       STM/TPR R lat/teres    5 min 5 mins 5 min                                                  Exercise Diary         pulleys 5 min 5 mins 5 mins 5 min 5 min 5 min       Table slides flex 10"x 10 10"x10 10"x10 10" x10 10"x10 10"x 10       Table slides scap 10"x 10 10"x10 10"x10 10" x10 10"x10 10" x10       Table slides ER 10"x 10 10"x10 10"x1 10" x10 10"x10 10"x 10       Wand ER 10"x 10 10"x10 10"x10 10" x10 10"x10 10" x10       Wand flexion 10"x 10 10"x10 10"x10 10" x10 10"x10 10" x10       Wand scaption 10"x 10 10"x10 10"x10 10"x 10 10"x10 10"x 10       Wall slides nv 10"x10 10"x10 10" x10 10"x10 10"x 10       tband rows nv 5"x20 RTB 5"x20 RTB RTB 5"x20 GTB 5"x20 GTB 5"x20                                                                                                                                                          Modalities                                                           Assessment: Tolerated treatment well  Patient demonstrated fatigue post treatment and would benefit from continued PT  Good technique w/ all treatment  Plan: Continue per plan of care

## 2018-12-26 ENCOUNTER — OFFICE VISIT (OUTPATIENT)
Dept: OBGYN CLINIC | Facility: CLINIC | Age: 48
End: 2018-12-26

## 2018-12-26 VITALS
HEIGHT: 66 IN | HEART RATE: 57 BPM | BODY MASS INDEX: 24.81 KG/M2 | WEIGHT: 154.4 LBS | SYSTOLIC BLOOD PRESSURE: 120 MMHG | DIASTOLIC BLOOD PRESSURE: 82 MMHG

## 2018-12-26 DIAGNOSIS — Z47.89 AFTERCARE FOLLOWING SURGERY OF THE MUSCULOSKELETAL SYSTEM: Primary | ICD-10-CM

## 2018-12-26 PROCEDURE — 99024 POSTOP FOLLOW-UP VISIT: CPT | Performed by: ORTHOPAEDIC SURGERY

## 2018-12-26 NOTE — PROGRESS NOTES
Assessment:     1  Aftercare following surgery of the musculoskeletal system        Plan:     Problem List Items Addressed This Visit        Other    Aftercare following surgery of the musculoskeletal system - Primary     Patient's symptom is most likely muscular origin  Continue therapy to rehabilitate his right shoulder  Will continue to limit his work activities  I will see patient back in 4 weeks for re-evaluation  All patient's questions were answered to his satisfaction  This note is created using dictation transcription  It may contain typographical errors, grammatical errors, improperly dictated words, background noise and other errors  Subjective:     Patient ID: Janay Morin is a 57 y o  male  Chief Complaint:  71-year-old gentleman follow-up status post right shoulder arthroscopy, subacromial decompression, biceps tenodesis, and distal clavicle resection on 10/8/2018  Patient is doing well  Patient does not have the pre-surgical pain any more  Patient should have occasional discomfort along the scapular  He has been attending physical therapy        Allergy:  No Known Allergies  Medications:  all current active meds have been reviewed  Past Medical History:  Past Medical History:   Diagnosis Date    Asthma     Eczema     PONV (postoperative nausea and vomiting)      Past Surgical History:  Past Surgical History:   Procedure Laterality Date    HERNIA REPAIR      ID SHLDR ARTHROSCOP,SURG,W/ROTAT CUFF REPR Right 10/8/2018    Procedure: ARTHROSCOPY SHOULDER WITH SUBACROMIAL DECOMPRESSION AND LABRAR REPAIR, BICEP TENOLYSIS;  Surgeon: Aida Salmon MD;  Location: [facility] OR;  Service: Orthopedics    WISDOM TOOTH EXTRACTION       Family History:  Family History   Problem Relation Age of Onset    No Known Problems Mother     No Known Problems Father      Social History:  History   Alcohol Use    Yes     Comment: 2x month     History   Drug Use No     History   Smoking Status    Never Smoker   Smokeless Tobacco    Never Used     Review of Systems   Constitutional: Negative  HENT: Negative  Eyes: Negative  Respiratory: Negative  Cardiovascular: Negative  Gastrointestinal: Negative  Endocrine: Negative  Genitourinary: Negative  Musculoskeletal: Positive for arthralgias (Right scapular laterally)  Negative for joint swelling  Skin: Negative  Neurological: Negative  Hematological: Negative  Psychiatric/Behavioral: Negative  Objective:  BP Readings from Last 1 Encounters:   12/26/18 120/82      Wt Readings from Last 1 Encounters:   12/26/18 70 kg (154 lb 6 4 oz)      BMI:   Estimated body mass index is 24 92 kg/m² as calculated from the following:    Height as of this encounter: 5' 6" (1 676 m)  Weight as of this encounter: 70 kg (154 lb 6 4 oz)  BSA:   Estimated body surface area is 1 79 meters squared as calculated from the following:    Height as of this encounter: 5' 6" (1 676 m)  Weight as of this encounter: 70 kg (154 lb 6 4 oz)  Physical Exam   Constitutional: He is oriented to person, place, and time  He appears well-developed and well-nourished  HENT:   Head: Normocephalic and atraumatic  Eyes: Conjunctivae and EOM are normal    Neck: Neck supple  Pulmonary/Chest: Effort normal    Neurological: He is alert and oriented to person, place, and time  Skin: Skin is warm  Psychiatric: He has a normal mood and affect  Nursing note and vitals reviewed  Right Shoulder Exam     Tenderness   Right shoulder tenderness location: Lateral border of the scapular  Range of Motion   The patient has normal right shoulder ROM  Muscle Strength   Abduction: 5/5   Internal Rotation: 5/5   External Rotation: 5/5   Supraspinatus: 5/5   Subscapularis: 5/5   Biceps: 5/5     Tests   Apprehension: negative  Cross Arm: negative  Drop Arm: negative  Impingement: negative    Other   Erythema: absent  Scars: present (Incisions intact  Healing well  No signs of infection)  Sensation: normal  Pulse: present              Sutures removed

## 2018-12-27 ENCOUNTER — OFFICE VISIT (OUTPATIENT)
Dept: PHYSICAL THERAPY | Facility: MEDICAL CENTER | Age: 48
End: 2018-12-27
Payer: OTHER MISCELLANEOUS

## 2018-12-27 DIAGNOSIS — Z47.89 AFTERCARE FOLLOWING SURGERY OF THE MUSCULOSKELETAL SYSTEM: ICD-10-CM

## 2018-12-27 DIAGNOSIS — Z98.890 STATUS POST SUBACROMIAL DECOMPRESSION: ICD-10-CM

## 2018-12-27 DIAGNOSIS — M75.41 IMPINGEMENT SYNDROME OF RIGHT SHOULDER: Primary | ICD-10-CM

## 2018-12-27 DIAGNOSIS — S43.431D TEAR OF RIGHT GLENOID LABRUM, SUBSEQUENT ENCOUNTER: ICD-10-CM

## 2018-12-27 PROCEDURE — 97110 THERAPEUTIC EXERCISES: CPT

## 2018-12-27 PROCEDURE — G8985 CARRY GOAL STATUS: HCPCS

## 2018-12-27 PROCEDURE — 97140 MANUAL THERAPY 1/> REGIONS: CPT

## 2018-12-27 PROCEDURE — G8984 CARRY CURRENT STATUS: HCPCS

## 2018-12-27 NOTE — PROGRESS NOTES
Daily Note     Today's date: 2018  Patient name: Jamil Card  : 1970  MRN: [id number]  Referring provider: Cayetano Lemos MD  Dx:   Encounter Diagnosis     ICD-10-CM    1  Impingement syndrome of right shoulder M75 41    2  Status post subacromial decompression Z98 890    3  Tear of right glenoid labrum, subsequent encounter S43 431D    4  Aftercare following surgery of the musculoskeletal system Z47 89                   Subjective: Pt reports sig soreness in shoulder today  He states he over did it today at work because they are so busy he had to do more than he should have  Objective: See treatment diary below      Precautions: asthma, protocol    Daily Treatment Diary     Manual        PROM R shoulder per protocol 15 min 15 mins 15 min 10 min w/ scap stab 10 min 10 min 10 min      STM/TPR R lat/teres    5 min 5 mins 5 min 5 min                                                 Exercise Diary        pulleys 5 min 5 mins 5 mins 5 min 5 min 5 min 5 min      Table slides flex 10"x 10 10"x10 10"x10 10" x10 10"x10 10"x 10 10"x10      Table slides scap 10"x 10 10"x10 10"x10 10" x10 10"x10 10" x10 10"x10      Table slides ER 10"x 10 10"x10 10"x1 10" x10 10"x10 10"x 10 10"x10      Wand ER 10"x 10 10"x10 10"x10 10" x10 10"x10 10" x10 10"x10      Wand flexion 10"x 10 10"x10 10"x10 10" x10 10"x10 10" x10 10"x10      Wand scaption 10"x 10 10"x10 10"x10 10"x 10 10"x10 10"x 10 10"x10      Wall slides nv 10"x10 10"x10 10" x10 10"x10 10"x 10 10"x10      tband rows nv 5"x20 RTB 5"x20 RTB RTB 5"x20 GTB 5"x20 GTB 5"x20 GTB 5"x20                                                                                                                                                         Modalities                                                           Assessment: Tolerated treatment fair  Patient demonstrated fatigue post treatment and would benefit from continued PT  Pt complains of pain all over shoulder  He reported pain throughout tx as well  Fair tolerance to PROM due to shoulder pain  Consider IASTM nv for soft tissue restrictions  Plan: Continue per plan of care

## 2019-01-03 ENCOUNTER — APPOINTMENT (OUTPATIENT)
Dept: PHYSICAL THERAPY | Facility: MEDICAL CENTER | Age: 49
End: 2019-01-03
Payer: OTHER MISCELLANEOUS

## 2019-01-08 ENCOUNTER — OFFICE VISIT (OUTPATIENT)
Dept: PHYSICAL THERAPY | Facility: MEDICAL CENTER | Age: 49
End: 2019-01-08
Payer: OTHER MISCELLANEOUS

## 2019-01-08 DIAGNOSIS — Z98.890 STATUS POST SUBACROMIAL DECOMPRESSION: ICD-10-CM

## 2019-01-08 DIAGNOSIS — S43.431D TEAR OF RIGHT GLENOID LABRUM, SUBSEQUENT ENCOUNTER: ICD-10-CM

## 2019-01-08 DIAGNOSIS — M75.41 IMPINGEMENT SYNDROME OF RIGHT SHOULDER: Primary | ICD-10-CM

## 2019-01-08 DIAGNOSIS — Z47.89 AFTERCARE FOLLOWING SURGERY OF THE MUSCULOSKELETAL SYSTEM: ICD-10-CM

## 2019-01-08 PROCEDURE — 97140 MANUAL THERAPY 1/> REGIONS: CPT

## 2019-01-08 PROCEDURE — 97110 THERAPEUTIC EXERCISES: CPT

## 2019-01-08 NOTE — PROGRESS NOTES
Daily Note     Today's date: 2019  Patient name: Kaylee Stack  : 1970  MRN: [id number]  Referring provider: Kaylee Geller MD  Dx:   Encounter Diagnosis     ICD-10-CM    1  Impingement syndrome of right shoulder M75 41    2  Status post subacromial decompression Z98 890    3  Tear of right glenoid labrum, subsequent encounter S43 431D    4  Aftercare following surgery of the musculoskeletal system Z47 89                   Subjective: Pt reports persistent soreness in shoulder and increased use at work  Objective: See treatment diary below      Precautions: asthma, protocol    Daily Treatment Diary     Manual       PROM R shoulder per protocol 15 min 15 mins 15 min 10 min w/ scap stab 10 min 10 min 10 min NP     IASTM prox biceps, pec and teres  5 min 5 mins 5 min 5 min IASTM 15 mins                                                Exercise Diary       pulleys 5 min 5 mins 5 mins 5 min 5 min 5 min 5 min 5 min     Table slides flex 10"x 10 10"x10 10"x10 10" x10 10"x10 10"x 10 10"x10 10"x10     Table slides scap 10"x 10 10"x10 10"x10 10" x10 10"x10 10" x10 10"x10 10"x10     Table slides ER 10"x 10 10"x10 10"x1 10" x10 10"x10 10"x 10 10"x10 10"x10     Wand ER 10"x 10 10"x10 10"x10 10" x10 10"x10 10" x10 10"x10 10"x10     Wand flexion 10"x 10 10"x10 10"x10 10" x10 10"x10 10" x10 10"x10 10"x10     Wand scaption 10"x 10 10"x10 10"x10 10"x 10 10"x10 10"x 10 10"x10 10"x10     Wall slides nv 10"x10 10"x10 10" x10 10"x10 10"x 10 10"x10 10"x10     tband rows nv 5"x20 RTB 5"x20 RTB RTB 5"x20 GTB 5"x20 GTB 5"x20 GTB 5"x20 GTB 5"x20                                                                                                                                                        Modalities                                                           Assessment: Tolerated treatment fair  Patient demonstrated fatigue post treatment and would benefit from continued PT  Used IASTM today to proximal bicep and pec as well as posterior shoulder with good tolerance  He reported decreased pain post tx  Soreness in shoulder reported w/ TE  Overall less sore compared to LV but he notes he has still been using it a lot at work  Plan: Continue per plan of care

## 2019-01-10 ENCOUNTER — OFFICE VISIT (OUTPATIENT)
Dept: PHYSICAL THERAPY | Facility: MEDICAL CENTER | Age: 49
End: 2019-01-10
Payer: OTHER MISCELLANEOUS

## 2019-01-10 DIAGNOSIS — M75.41 IMPINGEMENT SYNDROME OF RIGHT SHOULDER: Primary | ICD-10-CM

## 2019-01-10 DIAGNOSIS — Z47.89 AFTERCARE FOLLOWING SURGERY OF THE MUSCULOSKELETAL SYSTEM: ICD-10-CM

## 2019-01-10 DIAGNOSIS — Z98.890 STATUS POST SUBACROMIAL DECOMPRESSION: ICD-10-CM

## 2019-01-10 DIAGNOSIS — S43.431D TEAR OF RIGHT GLENOID LABRUM, SUBSEQUENT ENCOUNTER: ICD-10-CM

## 2019-01-10 PROCEDURE — 97110 THERAPEUTIC EXERCISES: CPT

## 2019-01-10 PROCEDURE — 97140 MANUAL THERAPY 1/> REGIONS: CPT

## 2019-01-10 NOTE — PROGRESS NOTES
Daily Note     Today's date: 1/10/2019  Patient name: Katarina Odom  : 1970  MRN: [id number]  Referring provider: Jf Jarrell MD  Dx:   Encounter Diagnosis     ICD-10-CM    1  Impingement syndrome of right shoulder M75 41    2  Status post subacromial decompression Z98 890    3  Tear of right glenoid labrum, subsequent encounter S43 431D    4  Aftercare following surgery of the musculoskeletal system Z47 89                   Subjective: Pt reported sig decrease in pain after receiving IASTM LV  He notes posterior shoulder still feels tight but he noticed a difference w/ functional tasks throughout his day that felt less painful post MT  He complains today of R UT tightness         Objective: See treatment diary below      Precautions: asthma, protocol    Daily Treatment Diary     Manual  11/20 11/29 12/4 12/6 12/13 12/20 12/27 1/8 1/10    PROM R shoulder per protocol 15 min 15 mins 15 min 10 min w/ scap stab 10 min 10 min 10 min NP np    IASTM R prox biceps, pec, teres, UT    5 min 5 mins 5 min 5 min IASTM 15 mins 20 mins                                               Exercise Diary  11/20 11/29 12/4 12/6 12/13 12/20 12/27 1/8 1/10    pulleys 5 min 5 mins 5 mins 5 min 5 min 5 min 5 min 5 min 5 mins    Table slides flex 10"x 10 10"x10 10"x10 10" x10 10"x10 10"x 10 10"x10 10"x10 10"x10    Table slides scap 10"x 10 10"x10 10"x10 10" x10 10"x10 10" x10 10"x10 10"x10 10"x10    Table slides ER 10"x 10 10"x10 10"x1 10" x10 10"x10 10"x 10 10"x10 10"x10 10"x10    Wand ER 10"x 10 10"x10 10"x10 10" x10 10"x10 10" x10 10"x10 10"x10 10"x10    Wand flexion 10"x 10 10"x10 10"x10 10" x10 10"x10 10" x10 10"x10 10"x10 10"x10    Wand scaption 10"x 10 10"x10 10"x10 10"x 10 10"x10 10"x 10 10"x10 10"x10 10"x10    Wall slides nv 10"x10 10"x10 10" x10 10"x10 10"x 10 10"x10 10"x10 10"x10    tband rows nv 5"x20 RTB 5"x20 RTB RTB 5"x20 GTB 5"x20 GTB 5"x20 GTB 5"x20 GTB 5"x20 GTB 5"x20    Pec Stretch          30"x3    [facility] instructed                                                                                                                             Modalities                                                           Assessment: Tolerated treatment fair  Patient demonstrated fatigue post treatment and would benefit from continued PT  Pt had sig restriction today over teres, UT and latissimus region  Decreased restriction noted in pec  Plan: Continue per plan of care

## 2019-01-15 ENCOUNTER — OFFICE VISIT (OUTPATIENT)
Dept: PHYSICAL THERAPY | Facility: MEDICAL CENTER | Age: 49
End: 2019-01-15
Payer: OTHER MISCELLANEOUS

## 2019-01-15 DIAGNOSIS — S43.431D TEAR OF RIGHT GLENOID LABRUM, SUBSEQUENT ENCOUNTER: ICD-10-CM

## 2019-01-15 DIAGNOSIS — Z47.89 AFTERCARE FOLLOWING SURGERY OF THE MUSCULOSKELETAL SYSTEM: ICD-10-CM

## 2019-01-15 DIAGNOSIS — M75.41 IMPINGEMENT SYNDROME OF RIGHT SHOULDER: Primary | ICD-10-CM

## 2019-01-15 DIAGNOSIS — Z98.890 STATUS POST SUBACROMIAL DECOMPRESSION: ICD-10-CM

## 2019-01-15 PROCEDURE — 97110 THERAPEUTIC EXERCISES: CPT

## 2019-01-15 PROCEDURE — 97140 MANUAL THERAPY 1/> REGIONS: CPT

## 2019-01-15 NOTE — PROGRESS NOTES
Daily Note     Today's date: 1/15/2019  Patient name: Talya Shi  : 1970  MRN: [id number]  Referring provider: Perry Flores MD  Dx:   Encounter Diagnosis     ICD-10-CM    1  Impingement syndrome of right shoulder M75 41    2  Status post subacromial decompression Z98 890    3  Tear of right glenoid labrum, subsequent encounter S43 431D    4  Aftercare following surgery of the musculoskeletal system Z47 89                   Subjective: Pt reports continued improvement in shoulder pain  He states he no longer has pain in pec region but still has some tightness/soreness in UT and posterior shoulder         Objective: See treatment diary below      Precautions: asthma, protocol    Daily Treatment Diary     Manual  11/20 11/29 12/4 12/6 12/13 12/20 12/27 1/8 1/10 1/15   PROM R shoulder per protocol 15 min 15 mins 15 min 10 min w/ scap stab 10 min 10 min 10 min NP np IR only   IASTM R prox biceps, pec, teres, UT    5 min 5 mins 5 min 5 min IASTM 15 mins 20 mins 20 mins                                              Exercise Diary  11/20 11/29 12/4 12/6 12/13 12/20 12/27 1/8 1/10 1/15   pulleys 5 min 5 mins 5 mins 5 min 5 min 5 min 5 min 5 min 5 mins 5 min   Table slides flex 10"x 10 10"x10 10"x10 10" x10 10"x10 10"x 10 10"x10 10"x10 10"x10 HEP   Table slides scap 10"x 10 10"x10 10"x10 10" x10 10"x10 10" x10 10"x10 10"x10 10"x10 HEP   Table slides ER 10"x 10 10"x10 10"x1 10" x10 10"x10 10"x 10 10"x10 10"x10 10"x10 HEP   Wand ER 10"x 10 10"x10 10"x10 10" x10 10"x10 10" x10 10"x10 10"x10 10"x10 10"x10   Wand flexion 10"x 10 10"x10 10"x10 10" x10 10"x10 10" x10 10"x10 10"x10 10"x10 10"x10   Wand scaption 10"x 10 10"x10 10"x10 10"x 10 10"x10 10"x 10 10"x10 10"x10 10"x10 10"x10   Wall slides nv 10"x10 10"x10 10" x10 10"x10 10"x 10 10"x10 10"x10 10"x10 10"x10   tband rows nv 5"x20 RTB 5"x20 RTB RTB 5"x20 GTB 5"x20 GTB 5"x20 GTB 5"x20 GTB 5"x20 GTB 5"x20 GTB 5"x20   Pec Stretch          30"x3 30"x3   UT strech instructed HEP                                                                                                                            Modalities                                                           Assessment: Tolerated treatment fair  Patient demonstrated fatigue post treatment and would benefit from continued PT  Decreased restriction noted today in posterior shoulder, sig tightness remains in UT  IASTM presented sig redness and petechiae to UT  No complaints of pain w/ TE  PROM WNL, mild tightness w/ IR  Plan: Continue per plan of care

## 2019-01-17 ENCOUNTER — EVALUATION (OUTPATIENT)
Dept: PHYSICAL THERAPY | Facility: MEDICAL CENTER | Age: 49
End: 2019-01-17
Payer: OTHER MISCELLANEOUS

## 2019-01-17 DIAGNOSIS — Z47.89 AFTERCARE FOLLOWING SURGERY OF THE MUSCULOSKELETAL SYSTEM: ICD-10-CM

## 2019-01-17 DIAGNOSIS — Z98.890 STATUS POST SUBACROMIAL DECOMPRESSION: ICD-10-CM

## 2019-01-17 DIAGNOSIS — S43.431D TEAR OF RIGHT GLENOID LABRUM, SUBSEQUENT ENCOUNTER: ICD-10-CM

## 2019-01-17 DIAGNOSIS — M75.41 IMPINGEMENT SYNDROME OF RIGHT SHOULDER: Primary | ICD-10-CM

## 2019-01-17 PROCEDURE — 97140 MANUAL THERAPY 1/> REGIONS: CPT | Performed by: PHYSICAL THERAPIST

## 2019-01-17 PROCEDURE — G8990 OTHER PT/OT CURRENT STATUS: HCPCS | Performed by: PHYSICAL THERAPIST

## 2019-01-17 PROCEDURE — G8991 OTHER PT/OT GOAL STATUS: HCPCS | Performed by: PHYSICAL THERAPIST

## 2019-01-17 PROCEDURE — 97110 THERAPEUTIC EXERCISES: CPT | Performed by: PHYSICAL THERAPIST

## 2019-01-17 NOTE — PROGRESS NOTES
PT Re-Evaluation     Today's date: 2019  Patient name: Rob Peterson  : 1970  MRN: [id number]  Referring provider: Mignon Fragoso MD  Dx:   Encounter Diagnosis     ICD-10-CM    1  Impingement syndrome of right shoulder M75 41    2  Status post subacromial decompression Z98 890    3  Tear of right glenoid labrum, subsequent encounter S43 431D    4  Aftercare following surgery of the musculoskeletal system Z47 89                   Assessment  Assessment details: Rob Peterson has attended 19 visits since initiating PT and has demonstrated overall improvement  Mobility and strength has improved with decreased reports of pain  Rob Peterson has demonstrated an improvement in function as well  Currently, he has made steady progress towards his goals, but continue to remain limited weight over shoulder height, excessive activity, heavy lifting  At this time, continued physical therapy is recommended in order to address their remaining impairments in effort to further improve function progressing w/ MD protocol  Impairments: abnormal or restricted ROM, activity intolerance, impaired physical strength, lacks appropriate home exercise program, pain with function and poor posture   Understanding of Dx/Px/POC: good   Prognosis: good    Goals  Short Term Goals:  1  Pain decreased 3 subjective ratings in 6 weeks PARTIALLY MET  2  PROM within normal limits in 6 weeks  MET    Long Term Goals:  1  AROM within normal limits in 12 weeks PARTIALLY MET  2  Patient will be independent with IADLS/ADLS PARTIALLY MET  3  Independent with HEP PARTIALLY MET  4  Pt will return to job duties w/out difficulty PARTIALLY MET      Plan  Patient would benefit from: skilled physical therapy  Planned modality interventions: cryotherapy  Planned therapy interventions: functional ROM exercises, flexibility, therapeutic exercise, stretching, strengthening, postural training, patient education, neuromuscular re-education, manual therapy and home exercise program  Frequency: 2x week  Duration in weeks: 8  Treatment plan discussed with: patient        Subjective Evaluation    History of Present Illness  Mechanism of injury: Pt reports continued improvement  Primary c/o is of tightness on superior shoulder as well as restrictions in UT and teres region  Pt reports most pain occurs w/ lifting any weight over shoulder height, prolonged use of RUE  Intermittent sleep disturbance persists due to muscle tightness  He has returned to work on modified duty; continues to avoid heavy lifting  Any lifting he has done has been w/ LUE and stabilizing w/ RUE  Has remained at 10# limit w/ RUE  Pain  At best pain ratin  At worst pain ratin    Patient Goals  Patient goals for therapy: decreased pain, increased motion, increased strength, independence with ADLs/IADLs, return to sport/leisure activities and return to work          Objective     Palpation     Additional Palpation Details  Significant restrictions persist over teres and infraspinatus region    Improved over UT and bicep    Active Range of Motion   Cervical/Thoracic Spine   Normal active range of motion    Right Shoulder   Flexion: 157 degrees   Abduction: 140 degrees     Passive Range of Motion     Right Shoulder   Flexion: 162 degrees   Abduction: 170 degrees   External rotation 45°: 75 degrees   Internal rotation 45°: 60 degrees     Right Elbow   Flexion: 132 degrees   Extension: 0 degrees     Strength/Myotome Testing     Right Shoulder     Planes of Motion   Flexion: 4   Abduction: 4   External rotation at 90°: 4   Internal rotation at 90°: 4           Precautions: asthma, protocol    Daily Treatment Diary     Manual              Graston R teres, infraspinatus, tricep 15 min            PROM R shoulder 10 min                                                       Exercise Diary              pulleys 5 min            Wall slides 10"x 10            Doorway pec stretch 30"x3 Wand ER 10"x 10            Wand flexion 10"x 10            Wand scaption 10"x 10            Tband rows BluTB 5"x20            Tband extensions GTB 5"x20                                                                                                                                                               Modalities

## 2019-01-22 ENCOUNTER — OFFICE VISIT (OUTPATIENT)
Dept: PHYSICAL THERAPY | Facility: MEDICAL CENTER | Age: 49
End: 2019-01-22
Payer: OTHER MISCELLANEOUS

## 2019-01-22 DIAGNOSIS — Z98.890 STATUS POST SUBACROMIAL DECOMPRESSION: ICD-10-CM

## 2019-01-22 DIAGNOSIS — Z47.89 AFTERCARE FOLLOWING SURGERY OF THE MUSCULOSKELETAL SYSTEM: ICD-10-CM

## 2019-01-22 DIAGNOSIS — S43.431D TEAR OF RIGHT GLENOID LABRUM, SUBSEQUENT ENCOUNTER: ICD-10-CM

## 2019-01-22 DIAGNOSIS — M75.41 IMPINGEMENT SYNDROME OF RIGHT SHOULDER: Primary | ICD-10-CM

## 2019-01-22 PROCEDURE — 97140 MANUAL THERAPY 1/> REGIONS: CPT | Performed by: PHYSICAL THERAPIST

## 2019-01-22 PROCEDURE — 97110 THERAPEUTIC EXERCISES: CPT | Performed by: PHYSICAL THERAPIST

## 2019-01-22 NOTE — PROGRESS NOTES
Daily Note     Today's date: 2019  Patient name: Robinson Day  : 1970  MRN: [id number]  Referring provider: Rosa Hussein MD  Dx:   Encounter Diagnosis     ICD-10-CM    1  Impingement syndrome of right shoulder M75 41    2  Status post subacromial decompression Z98 890    3  Tear of right glenoid labrum, subsequent encounter S43 431D    4  Aftercare following surgery of the musculoskeletal system Z47 89                   Subjective: Pt reports feeling good today      Objective: See treatment diary below  Precautions asthma, protocol    Specialty Daily Treatment Diary     Manual         Graston R teres, infraspinatus 10 min       PROM R shoulder 10 min                                   Exercise Diary         pulleys 5 min       Wall slides 10"x10       Doorway pec stretch 30"x3       Wand supine flex 10'x10       Wand supine scaption 10"x10       Wand supine ER 10"x10       TB rows GTB 5"x20       TB extensions GTB 5"x20                                                                                                           Modalities                                          Assessment: Tolerated treatment well  Patient demonstrated fatigue post treatment, exhibited good technique with therapeutic exercises and would benefit from continued PT   Restrictions persist in posterior shoulder  Plan: Continue per plan of care

## 2019-01-24 ENCOUNTER — OFFICE VISIT (OUTPATIENT)
Dept: PHYSICAL THERAPY | Facility: MEDICAL CENTER | Age: 49
End: 2019-01-24
Payer: OTHER MISCELLANEOUS

## 2019-01-24 DIAGNOSIS — Z47.89 AFTERCARE FOLLOWING SURGERY OF THE MUSCULOSKELETAL SYSTEM: ICD-10-CM

## 2019-01-24 DIAGNOSIS — M75.41 IMPINGEMENT SYNDROME OF RIGHT SHOULDER: Primary | ICD-10-CM

## 2019-01-24 DIAGNOSIS — S43.431D TEAR OF RIGHT GLENOID LABRUM, SUBSEQUENT ENCOUNTER: ICD-10-CM

## 2019-01-24 DIAGNOSIS — Z98.890 STATUS POST SUBACROMIAL DECOMPRESSION: ICD-10-CM

## 2019-01-24 PROCEDURE — 97110 THERAPEUTIC EXERCISES: CPT

## 2019-01-24 PROCEDURE — 97140 MANUAL THERAPY 1/> REGIONS: CPT

## 2019-01-24 NOTE — PROGRESS NOTES
Daily Note     Today's date: 2019  Patient name: Dean Almeida  : 1970  MRN: [id number]  Referring provider: Svetlana Trivedi MD  Dx:   Encounter Diagnosis     ICD-10-CM    1  Impingement syndrome of right shoulder M75 41    2  Status post subacromial decompression Z98 890    3  Tear of right glenoid labrum, subsequent encounter S43 431D    4  Aftercare following surgery of the musculoskeletal system Z47 89                   Subjective: Pt reports some soreness from using arm at work today  Objective: See treatment diary below  Precautions asthma, protocol    Specialty Daily Treatment Diary     Manual        Graston R teres, infraspinatus 10 min 10 mins      PROM R shoulder 10 min 10 mins                                  Exercise Diary        pulleys 5 min 5 mins      Wall slides 10"x10 10"x10      Doorway pec stretch 30"x3 30"x3      Wand supine flex 10'x10 10"x10      Wand supine scaption 10"x10 10"x10      Wand supine ER 10"x10 10"x10      TB rows GTB 5"x20 GTB 5"x20      TB extensions GTB 5"x20 GTB 5"x20                                                                                                          Modalities                                          Assessment: Tolerated treatment well  Patient demonstrated fatigue post treatment, exhibited good technique with therapeutic exercises and would benefit from continued PT   Restrictions persist in posterior shoulder  He also reports tightness in anterior shoulder  Plan: Continue per plan of care

## 2019-01-29 ENCOUNTER — APPOINTMENT (OUTPATIENT)
Dept: PHYSICAL THERAPY | Facility: MEDICAL CENTER | Age: 49
End: 2019-01-29
Payer: OTHER MISCELLANEOUS

## 2019-01-29 ENCOUNTER — OFFICE VISIT (OUTPATIENT)
Dept: OBGYN CLINIC | Facility: CLINIC | Age: 49
End: 2019-01-29

## 2019-01-29 VITALS
WEIGHT: 159 LBS | HEIGHT: 66 IN | SYSTOLIC BLOOD PRESSURE: 116 MMHG | BODY MASS INDEX: 25.55 KG/M2 | DIASTOLIC BLOOD PRESSURE: 78 MMHG | HEART RATE: 72 BPM

## 2019-01-29 DIAGNOSIS — Z47.89 AFTERCARE FOLLOWING SURGERY OF THE MUSCULOSKELETAL SYSTEM: Primary | ICD-10-CM

## 2019-01-29 PROCEDURE — 99024 POSTOP FOLLOW-UP VISIT: CPT | Performed by: ORTHOPAEDIC SURGERY

## 2019-01-29 NOTE — ASSESSMENT & PLAN NOTE
Patient is doing well status post right shoulder arthroscopy with subacromial decompression, distal clavicle resection and biceps tenodesis  Continue physical therapy  Discussed that most his symptoms are due to muscle tightness  Continue current work restrictions  Follow-up in 4 weeks for re-evaluation  All patient's questions were answered to his satisfaction  This note is created using dictation transcription  It may contain typographical errors, grammatical errors, improperly dictated words, background noise and other errors

## 2019-01-29 NOTE — PROGRESS NOTES
Assessment:     1  Aftercare following surgery of the musculoskeletal system        Plan:  The patient was seen and examined by Dr Melvin August and myself  Problem List Items Addressed This Visit        Other    Aftercare following surgery of the musculoskeletal system - Primary     Patient is doing well status post right shoulder arthroscopy with subacromial decompression, distal clavicle resection and biceps tenodesis  Continue physical therapy  Discussed that most his symptoms are due to muscle tightness  Continue current work restrictions  Follow-up in 4 weeks for re-evaluation  All patient's questions were answered to his satisfaction  This note is created using dictation transcription  It may contain typographical errors, grammatical errors, improperly dictated words, background noise and other errors  Subjective:     Patient ID: Padma Fernandes is a 57 y o  male  Chief Complaint:  65-year-old gentleman follow-up status post right shoulder arthroscopy, subacromial decompression, biceps tenodesis, and distal clavicle resection on 10/8/2018  Patient is doing well  Patient does not have the pre-surgical pain any more  His biggest complaint is muscle tightness around his scapula  He continues to attend physical therapy and feels gradual improvement  He has been working with restrictions with no issues        Allergy:  No Known Allergies  Medications:  all current active meds have been reviewed  Past Medical History:  Past Medical History:   Diagnosis Date    Asthma     Eczema     PONV (postoperative nausea and vomiting)      Past Surgical History:  Past Surgical History:   Procedure Laterality Date    HERNIA REPAIR      [place] SHLDR ARTHROSCOP,SURG,W/ROTAT CUFF REPR Right 10/8/2018    Procedure: ARTHROSCOPY SHOULDER WITH SUBACROMIAL DECOMPRESSION AND LABRAR REPAIR, BICEP TENOLYSIS;  Surgeon: Henna Arriaga MD;  Location:  MAIN OR;  Service: Orthopedics    WISDOM TOOTH EXTRACTION       Family History:  Family History   Problem Relation Age of Onset    No Known Problems Mother     No Known Problems Father      Social History:  History   Alcohol Use    Yes     Comment: 2x month     History   Drug Use No     History   Smoking Status    Never Smoker   Smokeless Tobacco    Never Used     Review of Systems   Constitutional: Negative  HENT: Negative  Eyes: Negative  Respiratory: Negative  Cardiovascular: Negative  Gastrointestinal: Negative  Endocrine: Negative  Genitourinary: Negative  Musculoskeletal: Positive for arthralgias (Right scapular laterally)  Negative for joint swelling  Skin: Negative  Neurological: Negative  Hematological: Negative  Psychiatric/Behavioral: Negative  Objective:  BP Readings from Last 1 Encounters:   01/29/19 116/78      Wt Readings from Last 1 Encounters:   01/29/19 72 1 kg (159 lb)      BMI:   Estimated body mass index is 25 66 kg/m² as calculated from the following:    Height as of this encounter: 5' 6" (1 676 m)  Weight as of this encounter: 72 1 kg (159 lb)  BSA:   Estimated body surface area is 1 81 meters squared as calculated from the following:    Height as of this encounter: 5' 6" (1 676 m)  Weight as of this encounter: 72 1 kg (159 lb)  Physical Exam   Constitutional: He is oriented to person, place, and time  He appears well-developed and well-nourished  HENT:   Head: Normocephalic and atraumatic  Eyes: Conjunctivae and EOM are normal    Neck: Neck supple  Pulmonary/Chest: Effort normal    Neurological: He is alert and oriented to person, place, and time  Skin: Skin is warm  Psychiatric: He has a normal mood and affect  Nursing note and vitals reviewed  Right Shoulder Exam     Tenderness   Right shoulder tenderness location: Lateral border of the scapular, latissimus dorsi insertion  Range of Motion   The patient has normal right shoulder ROM      Muscle Strength   Abduction: 5/5   Internal Rotation: 5/5   External Rotation: 5/5   Supraspinatus: 5/5   Subscapularis: 5/5   Biceps: 5/5     Tests   Apprehension: negative  Cross Arm: negative  Drop Arm: negative  Impingement: negative    Other   Erythema: absent  Scars: present (Incisions intact  Healing well    No signs of infection)  Sensation: normal  Pulse: present

## 2019-01-31 ENCOUNTER — OFFICE VISIT (OUTPATIENT)
Dept: PHYSICAL THERAPY | Facility: MEDICAL CENTER | Age: 49
End: 2019-01-31
Payer: OTHER MISCELLANEOUS

## 2019-01-31 DIAGNOSIS — Z47.89 AFTERCARE FOLLOWING SURGERY OF THE MUSCULOSKELETAL SYSTEM: ICD-10-CM

## 2019-01-31 DIAGNOSIS — M75.41 IMPINGEMENT SYNDROME OF RIGHT SHOULDER: Primary | ICD-10-CM

## 2019-01-31 DIAGNOSIS — S43.431D TEAR OF RIGHT GLENOID LABRUM, SUBSEQUENT ENCOUNTER: ICD-10-CM

## 2019-01-31 DIAGNOSIS — Z98.890 STATUS POST SUBACROMIAL DECOMPRESSION: ICD-10-CM

## 2019-01-31 PROCEDURE — 97110 THERAPEUTIC EXERCISES: CPT

## 2019-01-31 PROCEDURE — 97140 MANUAL THERAPY 1/> REGIONS: CPT

## 2019-01-31 NOTE — PROGRESS NOTES
Daily Note     Today's date: 2019  Patient name: Aman Quiñones  : 1970  MRN: [id number]  Referring provider: Briseida Espana MD  Dx:   Encounter Diagnosis     ICD-10-CM    1  Impingement syndrome of right shoulder M75 41    2  Status post subacromial decompression Z98 890    3  Tear of right glenoid labrum, subsequent encounter S43 431D    4  Aftercare following surgery of the musculoskeletal system Z47 89                   Subjective: Pt reports soreness from shoveling snow  Objective: See treatment diary below  Precautions asthma, protocol    Specialty Daily Treatment Diary     Manual       Graston R teres, infraspinatus 10 min 10 mins 15 min     PROM R shoulder 10 min 10 mins 5 min                                 Exercise Diary       pulleys 5 min 5 mins 5 mins     Wall slides 10"x10 10"x10 10"x10     Doorway pec stretch 30"x3 30"x3 30"x3     Wand supine flex 10'x10 10"x10 NP HEP     Wand supine scaption 10"x10 10"x10 NP HEP     Wand supine ER 10"x10 10"x10 NP HEP     TB rows GTB 5"x20 GTB 5"x20 GTB 5"x20     TB extensions GTB 5"x20 GTB 5"x20 GTB 5"x20                                                                                                         Modalities                                          Assessment: Tolerated treatment well  Patient demonstrated fatigue post treatment, exhibited good technique with therapeutic exercises and would benefit from continued PT   Restriction and petichaie present in teres, lats and prox biceps today  Decreased scapular winging noted w/ passive flexion  Plan: Continue per plan of care  progress w/ strength training nv

## 2019-02-05 ENCOUNTER — OFFICE VISIT (OUTPATIENT)
Dept: PHYSICAL THERAPY | Facility: MEDICAL CENTER | Age: 49
End: 2019-02-05
Payer: OTHER MISCELLANEOUS

## 2019-02-05 DIAGNOSIS — S43.431D TEAR OF RIGHT GLENOID LABRUM, SUBSEQUENT ENCOUNTER: ICD-10-CM

## 2019-02-05 DIAGNOSIS — Z47.89 AFTERCARE FOLLOWING SURGERY OF THE MUSCULOSKELETAL SYSTEM: ICD-10-CM

## 2019-02-05 DIAGNOSIS — M75.41 IMPINGEMENT SYNDROME OF RIGHT SHOULDER: Primary | ICD-10-CM

## 2019-02-05 DIAGNOSIS — Z98.890 STATUS POST SUBACROMIAL DECOMPRESSION: ICD-10-CM

## 2019-02-05 PROCEDURE — 97110 THERAPEUTIC EXERCISES: CPT

## 2019-02-05 PROCEDURE — 97140 MANUAL THERAPY 1/> REGIONS: CPT

## 2019-02-05 NOTE — PROGRESS NOTES
Daily Note     Today's date: 2019  Patient name: Janay Morin  : 1970  MRN: [id number]  Referring provider: Queta Doss MD  Dx:   Encounter Diagnosis     ICD-10-CM    1  Impingement syndrome of right shoulder M75 41    2  Status post subacromial decompression Z98 890    3  Tear of right glenoid labrum, subsequent encounter S43 431D    4  Aftercare following surgery of the musculoskeletal system Z47 89                   Subjective: Pt reports mild soreness today  He states he fell over the weekend on R elbow down the steps but denied any injury to shoulder  Objective: See treatment diary below  Precautions asthma, protocol    Specialty Daily Treatment Diary     Manual   2/5    Graston R teres, infraspinatus, UT 10 min 10 mins 15 min 10 min    PROM R shoulder 10 min 10 mins 5 min 5 min IR only                                Exercise Diary   2/5    pulleys 5 min 5 mins 5 mins 5 mins    Wall slides 10"x10 10"x10 10"x10 10"x10    Doorway pec stretch 30"x3 30"x3 30"x3 30"x3    Wand supine flex 10'x10 10"x10 NP HEP HEP    Wand supine scaption 10"x10 10"x10 NP HEP HEP    Wand supine ER 10"x10 10"x10 NP HEP HEP    TB rows GTB 5"x20 GTB 5"x20 GTB 5"x20 5"x20 GTB    TB extensions GTB 5"x20 GTB 5"x20 GTB 5"x20 5"x20 GTB    TB ER/IR    RTB 2x10    PNF D2 flexion    YTB x20    AROM flexion supine    x20    AROM abd s/l    x20    AROM ER s/l    x20                                                                Modalities                                          Assessment: Tolerated treatment well  Patient demonstrated fatigue post treatment, exhibited good technique with therapeutic exercises and would benefit from continued PT   Progressed pt today w/ TE as charted  Pt challenged w/ ER but denied pain throughout  Decreased restriction noted today w/ IASTM      Plan: Continue per plan of care

## 2019-02-07 ENCOUNTER — APPOINTMENT (OUTPATIENT)
Dept: PHYSICAL THERAPY | Facility: MEDICAL CENTER | Age: 49
End: 2019-02-07
Payer: OTHER MISCELLANEOUS

## 2019-02-12 ENCOUNTER — APPOINTMENT (OUTPATIENT)
Dept: PHYSICAL THERAPY | Facility: MEDICAL CENTER | Age: 49
End: 2019-02-12
Payer: OTHER MISCELLANEOUS

## 2019-02-13 ENCOUNTER — OFFICE VISIT (OUTPATIENT)
Dept: PHYSICAL THERAPY | Facility: MEDICAL CENTER | Age: 49
End: 2019-02-13
Payer: OTHER MISCELLANEOUS

## 2019-02-13 DIAGNOSIS — Z98.890 STATUS POST SUBACROMIAL DECOMPRESSION: ICD-10-CM

## 2019-02-13 DIAGNOSIS — Z47.89 AFTERCARE FOLLOWING SURGERY OF THE MUSCULOSKELETAL SYSTEM: ICD-10-CM

## 2019-02-13 DIAGNOSIS — M75.41 IMPINGEMENT SYNDROME OF RIGHT SHOULDER: Primary | ICD-10-CM

## 2019-02-13 DIAGNOSIS — S43.431D TEAR OF RIGHT GLENOID LABRUM, SUBSEQUENT ENCOUNTER: ICD-10-CM

## 2019-02-13 PROCEDURE — 97140 MANUAL THERAPY 1/> REGIONS: CPT

## 2019-02-13 PROCEDURE — 97110 THERAPEUTIC EXERCISES: CPT

## 2019-02-13 NOTE — PROGRESS NOTES
Daily Note     Today's date: 2019  Patient name: Bradly Baumgarten  : 1970  MRN: [id number]  Referring provider: Gustavo Gerard MD  Dx:   Encounter Diagnosis     ICD-10-CM    1  Impingement syndrome of right shoulder M75 41    2  Tear of right glenoid labrum, subsequent encounter S43 431D    3  Aftercare following surgery of the musculoskeletal system Z47 89    4  Status post subacromial decompression Z98 890                   Subjective: Pt reports soreness in shoulder from shoveling  Objective: See treatment diary below  Precautions asthma, protocol    Specialty Daily Treatment Diary     Manual     Graston R teres, infraspinatus, UT 10 min 10 mins 15 min 10 min 10 min   PROM R shoulder 10 min 10 mins 5 min 5 min IR only 5 min IR                               Exercise Diary     pulleys 5 min 5 mins 5 mins 5 mins 5 min   Wall slides 10"x10 10"x10 10"x10 10"x10 10"x10   Doorway pec stretch 30"x3 30"x3 30"x3 30"x3 30"x3   Wand supine flex 10'x10 10"x10 NP HEP HEP HEP   Wand supine scaption 10"x10 10"x10 NP HEP HEP HEP   Wand supine ER 10"x10 10"x10 NP HEP HEP HEP   TB rows GTB 5"x20 GTB 5"x20 GTB 5"x20 5"x20 GTB GTB 5"x20   TB extensions GTB 5"x20 GTB 5"x20 GTB 5"x20 5"x20 GTB GTB 5"x20   TB ER/IR    RTB 2x10 RTB 2x10   PNF D2 flexion    YTB x20 YTB x15   AROM flexion supine    x20 x20 1#   AROM abd s/l    x20 x20 1#   AROM ER s/l    x20 x20 1#   UBE     5 mins BWD                                                       Modalities                                          Assessment: Tolerated treatment well  Patient demonstrated fatigue post treatment, exhibited good technique with therapeutic exercises and would benefit from continued PT  Progressed pt today w/ 1# for PRE's and UBE w/ good tolerance  Plan: Continue per plan of care

## 2019-02-14 ENCOUNTER — APPOINTMENT (OUTPATIENT)
Dept: PHYSICAL THERAPY | Facility: MEDICAL CENTER | Age: 49
End: 2019-02-14
Payer: OTHER MISCELLANEOUS

## 2019-02-15 ENCOUNTER — APPOINTMENT (OUTPATIENT)
Dept: PHYSICAL THERAPY | Facility: MEDICAL CENTER | Age: 49
End: 2019-02-15
Payer: OTHER MISCELLANEOUS

## 2019-02-19 ENCOUNTER — APPOINTMENT (OUTPATIENT)
Dept: PHYSICAL THERAPY | Facility: MEDICAL CENTER | Age: 49
End: 2019-02-19
Payer: OTHER MISCELLANEOUS

## 2019-02-21 ENCOUNTER — APPOINTMENT (OUTPATIENT)
Dept: PHYSICAL THERAPY | Facility: MEDICAL CENTER | Age: 49
End: 2019-02-21
Payer: OTHER MISCELLANEOUS

## 2019-02-26 ENCOUNTER — OFFICE VISIT (OUTPATIENT)
Dept: OBGYN CLINIC | Facility: CLINIC | Age: 49
End: 2019-02-26
Payer: OTHER MISCELLANEOUS

## 2019-02-26 ENCOUNTER — OFFICE VISIT (OUTPATIENT)
Dept: PHYSICAL THERAPY | Facility: MEDICAL CENTER | Age: 49
End: 2019-02-26
Payer: OTHER MISCELLANEOUS

## 2019-02-26 VITALS
SYSTOLIC BLOOD PRESSURE: 132 MMHG | DIASTOLIC BLOOD PRESSURE: 95 MMHG | HEIGHT: 66 IN | WEIGHT: 158.6 LBS | HEART RATE: 62 BPM | BODY MASS INDEX: 25.49 KG/M2

## 2019-02-26 DIAGNOSIS — Z98.890 STATUS POST SUBACROMIAL DECOMPRESSION: ICD-10-CM

## 2019-02-26 DIAGNOSIS — S43.431D TEAR OF RIGHT GLENOID LABRUM, SUBSEQUENT ENCOUNTER: ICD-10-CM

## 2019-02-26 DIAGNOSIS — M75.41 IMPINGEMENT SYNDROME OF RIGHT SHOULDER: Primary | ICD-10-CM

## 2019-02-26 DIAGNOSIS — Z47.89 AFTERCARE FOLLOWING SURGERY OF THE MUSCULOSKELETAL SYSTEM: Primary | ICD-10-CM

## 2019-02-26 DIAGNOSIS — Z47.89 AFTERCARE FOLLOWING SURGERY OF THE MUSCULOSKELETAL SYSTEM: ICD-10-CM

## 2019-02-26 PROCEDURE — 97110 THERAPEUTIC EXERCISES: CPT | Performed by: PHYSICAL THERAPIST

## 2019-02-26 PROCEDURE — 97140 MANUAL THERAPY 1/> REGIONS: CPT | Performed by: PHYSICAL THERAPIST

## 2019-02-26 PROCEDURE — 99213 OFFICE O/P EST LOW 20 MIN: CPT | Performed by: ORTHOPAEDIC SURGERY

## 2019-02-26 NOTE — PROGRESS NOTES
Assessment:     1  Aftercare following surgery of the musculoskeletal system        Plan:     Problem List Items Addressed This Visit        Other    Aftercare following surgery of the musculoskeletal system - Primary     Status post right shoulder arthroscopy, biceps tenodesis, subacromial decompression, and distal clavicle resection  Patient is doing very well  His shoulder has been recovering well  We will allow patient to return to work without restrictions  He will continue and finish the therapy  I advised patient to contact me if his symptoms change, otherwise will see him back as needed  All patient's questions were answered to his satisfaction  This note is created using dictation transcription  It may contain typographical errors, grammatical errors, improperly dictated words, background noise and other errors  Subjective:     Patient ID: Laila Pritchett is a 57 y o  male  Chief Complaint:  73-year-old gentleman follow-up status post right shoulder arthroscopy, subacromial decompression, biceps tenodesis, and distal clavicle resection on 10/8/2018  Patient is doing very well  Patient does not have the pre-surgical pain any more  He has been attending physical therapy  He does experience fatigue with prolonged repetitive activities  Patient is pleased with the results of his surgery  He feels that he should be able to return to his work and limit some heavy lifting      Allergy:  No Known Allergies  Medications:  all current active meds have been reviewed  Past Medical History:  Past Medical History:   Diagnosis Date    Asthma     Eczema     PONV (postoperative nausea and vomiting)      Past Surgical History:  Past Surgical History:   Procedure Laterality Date    HERNIA REPAIR      [place] SHLDR ARTHROSCOP,SURG,W/ROTAT CUFF REPR Right 10/8/2018    Procedure: ARTHROSCOPY SHOULDER WITH SUBACROMIAL DECOMPRESSION AND LABRAR REPAIR, BICEP TENOLYSIS;  Surgeon: Sukhwinder Ochoa MD;  Location: [facility];  Service: Orthopedics    WISDOM TOOTH EXTRACTION       Family History:  Family History   Problem Relation Age of Onset    No Known Problems Mother     No Known Problems Father      Social History:  Social History     Substance and Sexual Activity   Alcohol Use Yes    Comment: 2x month     Social History     Substance and Sexual Activity   Drug Use No     Social History     Tobacco Use   Smoking Status Never Smoker   Smokeless Tobacco Never Used     Review of Systems   Constitutional: Negative  HENT: Negative  Eyes: Negative  Respiratory: Negative  Cardiovascular: Negative  Gastrointestinal: Negative  Endocrine: Negative  Genitourinary: Negative  Musculoskeletal: Negative for arthralgias (Right scapular laterally) and joint swelling  Skin: Negative  Neurological: Negative  Hematological: Negative  Psychiatric/Behavioral: Negative  Objective:  BP Readings from Last 1 Encounters:   02/26/19 132/95      Wt Readings from Last 1 Encounters:   02/26/19 71 9 kg (158 lb 9 6 oz)      BMI:   Estimated body mass index is 25 6 kg/m² as calculated from the following:    Height as of this encounter: 5' 6" (1 676 m)  Weight as of this encounter: 71 9 kg (158 lb 9 6 oz)  BSA:   Estimated body surface area is 1 81 meters squared as calculated from the following:    Height as of this encounter: 5' 6" (1 676 m)  Weight as of this encounter: 71 9 kg (158 lb 9 6 oz)  Physical Exam   Constitutional: He is oriented to person, place, and time  He appears well-developed and well-nourished  HENT:   Head: Normocephalic and atraumatic  Eyes: Conjunctivae and EOM are normal    Neck: Neck supple  Pulmonary/Chest: Effort normal    Neurological: He is alert and oriented to person, place, and time  Skin: Skin is warm  Psychiatric: He has a normal mood and affect  Nursing note and vitals reviewed      Right Shoulder Exam     Tenderness   The patient is experiencing no tenderness  Range of Motion   The patient has normal right shoulder ROM  Muscle Strength   Abduction: 5/5   Internal rotation: 5/5   External rotation: 5/5   Supraspinatus: 5/5   Subscapularis: 5/5   Biceps: 5/5     Tests   Apprehension: negative  Cross arm: negative  Impingement: negative  Drop arm: negative    Other   Erythema: absent  Scars: present (Incisions intact  Healing well    No signs of infection)  Sensation: normal  Pulse: present

## 2019-02-26 NOTE — ASSESSMENT & PLAN NOTE
Status post right shoulder arthroscopy, biceps tenodesis, subacromial decompression, and distal clavicle resection  Patient is doing very well  His shoulder has been recovering well  We will allow patient to return to work without restrictions  He will continue and finish the therapy  I advised patient to contact me if his symptoms change, otherwise will see him back as needed  All patient's questions were answered to his satisfaction  This note is created using dictation transcription  It may contain typographical errors, grammatical errors, improperly dictated words, background noise and other errors

## 2019-02-26 NOTE — PROGRESS NOTES
Daily Note     Today's date: 2019  Patient name: Danica Bustillos  : 1970  MRN: [id number]  Referring provider: Carolina Hines MD  Dx:   Encounter Diagnosis     ICD-10-CM    1  Impingement syndrome of right shoulder M75 41    2  Tear of right glenoid labrum, subsequent encounter S43 431D    3  Aftercare following surgery of the musculoskeletal system Z47 89    4  Status post subacromial decompression Z98 890                   Subjective: Pt reports some soreness from using circular saw on Friday  Otherwise, feels he is improving  Objective: See treatment diary below  Precautions asthma, protocol    Specialty Daily Treatment Diary     Manual     Graston R teres, infraspinatus, UT 10 min    10 min   PROM R shoulder 5 min IR    5 min IR                               Exercise Diary     Pulleys 5 min    5 min           Doorway pec stretch 30"x3    30"x3                           TB rows GTB 5"x20    GTB 5"x20   TB extensions GTB 5"x20    GTB 5"x20   TB ER/IR RTB(ER), GTB(IR) 2x10       PNF D2 flexion YTB x20    YTB x15   AROM flexion supine 2# x20    x20 1#   AROM abd s/l 2# x20    x20 1#   AROM ER s/l 2# x20    x20 1#   UBE 5 min bwd    5 mins BWD                                                       Modalities                                          Assessment: Tolerated treatment well  Patient demonstrated fatigue post treatment, exhibited good technique with therapeutic exercises and would benefit from continued PT  Most restrictions present over teres        Plan: Continue per plan of care

## 2019-02-28 ENCOUNTER — EVALUATION (OUTPATIENT)
Dept: PHYSICAL THERAPY | Facility: MEDICAL CENTER | Age: 49
End: 2019-02-28
Payer: OTHER MISCELLANEOUS

## 2019-02-28 DIAGNOSIS — Z47.89 AFTERCARE FOLLOWING SURGERY OF THE MUSCULOSKELETAL SYSTEM: ICD-10-CM

## 2019-02-28 DIAGNOSIS — S43.431D TEAR OF RIGHT GLENOID LABRUM, SUBSEQUENT ENCOUNTER: ICD-10-CM

## 2019-02-28 DIAGNOSIS — M75.41 IMPINGEMENT SYNDROME OF RIGHT SHOULDER: Primary | ICD-10-CM

## 2019-02-28 DIAGNOSIS — Z98.890 STATUS POST SUBACROMIAL DECOMPRESSION: ICD-10-CM

## 2019-02-28 PROCEDURE — G8990 OTHER PT/OT CURRENT STATUS: HCPCS | Performed by: PHYSICAL THERAPIST

## 2019-02-28 PROCEDURE — 97140 MANUAL THERAPY 1/> REGIONS: CPT | Performed by: PHYSICAL THERAPIST

## 2019-02-28 PROCEDURE — 97110 THERAPEUTIC EXERCISES: CPT | Performed by: PHYSICAL THERAPIST

## 2019-02-28 PROCEDURE — G8991 OTHER PT/OT GOAL STATUS: HCPCS | Performed by: PHYSICAL THERAPIST

## 2019-02-28 NOTE — PROGRESS NOTES
PT Re-Evaluation  and PT Discharge    Today's date: 2019  Patient name: Ilene Gomez  : 1970  MRN: [id number]  Referring provider: All Thomson MD  Dx:   Encounter Diagnosis     ICD-10-CM    1  Impingement syndrome of right shoulder M75 41    2  Tear of right glenoid labrum, subsequent encounter S43 431D    3  Aftercare following surgery of the musculoskeletal system Z47 89    4  Status post subacromial decompression Z98 890                   Assessment  Assessment details: Ilene Gomez has attended 26 visits since initiating PT and has demonstrated overall improvement  Mobility and strength has improved with decreased reports of pain  Ilene Gomez has demonstrated an improvement in function as well  Currently, he has made steady progress towards his goals, but continue to remain limited w/ heavy lifting especially overhead  Trial d/c today to independent HEP; will return to the gym beginning w/ light weight as tolerated  Impairments: abnormal or restricted ROM, activity intolerance, impaired physical strength, lacks appropriate home exercise program, pain with function and poor posture   Understanding of Dx/Px/POC: good   Prognosis: good    Goals  Short Term Goals:  1  Pain decreased 3 subjective ratings in 6 weeks  MET  2  PROM within normal limits in 6 weeks  MET    Long Term Goals:  1  AROM within normal limits in 12 weeks  MET  2  Patient will be independent with IADLS/ADLS  MET  3  Independent with HEP  MET  4  Pt will return to job duties w/out difficulty MET      Plan  Patient would benefit from: skilled physical therapy  Planned modality interventions: cryotherapy  Planned therapy interventions: functional ROM exercises, flexibility, therapeutic exercise, stretching, strengthening, postural training, patient education, neuromuscular re-education, manual therapy and home exercise program  Frequency: 2x week  Duration in weeks: 1  Treatment plan discussed with: patient        Subjective Evaluation    History of Present Illness  Mechanism of injury: Pt reports continued improvement  Most pain occurs w/ excessive activity and "over doing it"  Most pain is located over superior / anterior aspect of shoulder w/ tightness in posterior shoulder  Improved w/ overhead lifting; challenged at about 20#  No longer c/o sleep disturbance  IHas returned to most job duties w/ the exception of heavy lifting over 40# and overhead lifting over 20#       Pain  At best pain ratin  At worst pain ratin    Patient Goals  Patient goals for therapy: decreased pain, increased motion, increased strength, independence with ADLs/IADLs, return to sport/leisure activities and return to work          Objective     Palpation     Additional Palpation Details  Restrictions present over teres and infraspinatus    Active Range of Motion   Cervical/Thoracic Spine     Normal active range of motion    Right Shoulder   Flexion: 160 degrees   Abduction: 170 degrees     Passive Range of Motion     Right Shoulder   Flexion: 172 degrees   Abduction: 180 degrees   External rotation 45°: 85 degrees   Internal rotation 45°: 60 degrees     Right Elbow   Normal passive range of motion    Strength/Myotome Testing     Right Shoulder     Planes of Motion   Flexion: 5   Abduction: 5   External rotation at 90°: 5   Internal rotation at 90°: 5           Precautions: asthma, protocol    Daily Treatment Diary     Manual              Graston R teres, infraspinatus, tricep 15 min            PROM R shoulder assessed                                                       Exercise Diary              pulleys 5 min            Wall slides 10"x 10            Doorway pec stretch 30"x3            Supine active shoulder flex 2# x20            sidelying active shoulder abd 2# x20            sidelying ER 2# x20            UBE 5 min            Tband rows GTB 5"x20            Tband extensions GTB 5"x20            TB IR GTB x20            TB ER RTB x20            TB PNF D2 flex yellow x20                                                                                                                        Modalities

## 2021-04-28 ENCOUNTER — APPOINTMENT (EMERGENCY)
Dept: RADIOLOGY | Facility: HOSPITAL | Age: 51
End: 2021-04-28

## 2021-04-28 ENCOUNTER — HOSPITAL ENCOUNTER (EMERGENCY)
Facility: HOSPITAL | Age: 51
Discharge: HOME | End: 2021-04-28
Attending: EMERGENCY MEDICINE | Admitting: EMERGENCY MEDICINE

## 2021-04-28 VITALS
RESPIRATION RATE: 18 BRPM | SYSTOLIC BLOOD PRESSURE: 132 MMHG | HEIGHT: 66 IN | DIASTOLIC BLOOD PRESSURE: 74 MMHG | WEIGHT: 152.12 LBS | HEART RATE: 70 BPM | BODY MASS INDEX: 24.45 KG/M2 | TEMPERATURE: 98.3 F | OXYGEN SATURATION: 98 %

## 2021-04-28 DIAGNOSIS — S22.39XA RIB FRACTURE: ICD-10-CM

## 2021-04-28 DIAGNOSIS — R07.89 CHEST WALL PAIN: Primary | ICD-10-CM

## 2021-04-28 DIAGNOSIS — S43.109A: ICD-10-CM

## 2021-04-28 PROCEDURE — 73030 X-RAY EXAM OF SHOULDER: CPT

## 2021-04-28 PROCEDURE — G1004 CDSM NDSC: HCPCS

## 2021-04-28 PROCEDURE — 71045 X-RAY EXAM CHEST 1 VIEW: CPT

## 2021-04-28 PROCEDURE — 71250 CT THORAX DX C-: CPT

## 2021-04-28 PROCEDURE — 73000 X-RAY EXAM OF COLLAR BONE: CPT

## 2021-04-28 PROCEDURE — 99284 EMERGENCY DEPT VISIT MOD MDM: CPT | Performed by: EMERGENCY MEDICINE

## 2021-04-28 PROCEDURE — 99284 EMERGENCY DEPT VISIT MOD MDM: CPT

## 2021-04-28 RX ORDER — ACETAMINOPHEN 325 MG/1
650 TABLET ORAL ONCE
Status: COMPLETED | OUTPATIENT
Start: 2021-04-28 | End: 2021-04-28

## 2021-04-28 RX ADMIN — ACETAMINOPHEN 650 MG: 325 TABLET ORAL at 15:09
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
## 2021-04-28 NOTE — DISCHARGE INSTRUCTIONS
Patient calls requesting a refill of tramadol 50 mg 1 tab every 6 hours as needed.  Last  Script was given for #60 on 9/6/17.     Patient does have a med management agreement,    Last visit 5/12/17    Next office visit 11/20/17       Treat your pain at home with ibuprofen and Tylenol  Use the incentive spirometer  Ice your shoulder  Please follow up with the orthopedic doctors  Keep your arm in the sling for the next several days
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
## 2021-05-05 ENCOUNTER — HOSPITAL ENCOUNTER (EMERGENCY)
Facility: HOSPITAL | Age: 51
Discharge: HOME | End: 2021-05-05
Attending: EMERGENCY MEDICINE | Admitting: EMERGENCY MEDICINE

## 2021-05-05 ENCOUNTER — APPOINTMENT (EMERGENCY)
Dept: RADIOLOGY | Facility: HOSPITAL | Age: 51
End: 2021-05-05

## 2021-05-05 VITALS
BODY MASS INDEX: 24.11 KG/M2 | OXYGEN SATURATION: 99 % | WEIGHT: 150 LBS | DIASTOLIC BLOOD PRESSURE: 88 MMHG | HEART RATE: 82 BPM | SYSTOLIC BLOOD PRESSURE: 143 MMHG | RESPIRATION RATE: 20 BRPM | HEIGHT: 66 IN

## 2021-05-05 DIAGNOSIS — M94.0 COSTOCHONDRITIS: ICD-10-CM

## 2021-05-05 DIAGNOSIS — R07.89 RIGHT-SIDED CHEST WALL PAIN: Primary | ICD-10-CM

## 2021-05-05 PROCEDURE — 96372 THER/PROPH/DIAG INJ SC/IM: CPT

## 2021-05-05 PROCEDURE — 99285 EMERGENCY DEPT VISIT HI MDM: CPT | Performed by: EMERGENCY MEDICINE

## 2021-05-05 PROCEDURE — 71101 X-RAY EXAM UNILAT RIBS/CHEST: CPT

## 2021-05-05 PROCEDURE — 99283 EMERGENCY DEPT VISIT LOW MDM: CPT

## 2021-05-05 RX ORDER — HYDROMORPHONE HCL/PF 1 MG/ML
1 SYRINGE (ML) INJECTION ONCE
Status: COMPLETED | OUTPATIENT
Start: 2021-05-05 | End: 2021-05-05

## 2021-05-05 RX ORDER — KETOROLAC TROMETHAMINE 30 MG/ML
15 INJECTION, SOLUTION INTRAMUSCULAR; INTRAVENOUS ONCE
Status: COMPLETED | OUTPATIENT
Start: 2021-05-05 | End: 2021-05-05

## 2021-05-05 RX ORDER — LIDOCAINE 50 MG/G
1 PATCH TOPICAL ONCE
Status: DISCONTINUED | OUTPATIENT
Start: 2021-05-05 | End: 2021-05-05 | Stop reason: HOSPADM

## 2021-05-05 RX ORDER — OXYCODONE HYDROCHLORIDE AND ACETAMINOPHEN 5; 325 MG/1; MG/1
1 TABLET ORAL EVERY 6 HOURS PRN
Qty: 6 TABLET | Refills: 0 | Status: SHIPPED | OUTPATIENT
Start: 2021-05-05

## 2021-05-05 RX ADMIN — KETOROLAC TROMETHAMINE 15 MG: 30 INJECTION, SOLUTION INTRAMUSCULAR at 14:24

## 2021-05-05 RX ADMIN — HYDROMORPHONE HYDROCHLORIDE 1 MG: 1 INJECTION, SOLUTION INTRAMUSCULAR; INTRAVENOUS; SUBCUTANEOUS at 14:24

## 2021-05-05 RX ADMIN — LIDOCAINE 1 PATCH: 50 PATCH TOPICAL at 14:23

## 2021-05-05 NOTE — ED PROVIDER NOTES
Final Diagnosis:  1  Right-sided chest wall pain    2  Costochondritis      Chief Complaint   Patient presents with    Rib Pain     pt reports healing rib fx's, sneezed and re-fx's his ribs  51 yo male presents for R anterior chest wall pain radiating to R upper back starting after he had paroxysmal sneezing  Pain severe, worse with movement, coughing, breathing, sneezing  No other a/e factors  Denies dyspnea, f/c/n/v, rash  No other c/o at this time  He was here last week for eval of R chest trauma  Underwent CT chest demonstrating old fx of R 7th, 8th, 9th ribs  ROS:  R anterior chest wall pain (as above)  Denies dyspnea, f/c/n/v, rash  All other systems reviewed as negative  PMH:  Past Medical History:   Diagnosis Date    Asthma     Eczema     PONV (postoperative nausea and vomiting)      PSH:  Past Surgical History:   Procedure Laterality Date    HERNIA REPAIR      [facility] SHLDR ARTHROSCOP,SURG,W/ROTAT CUFF REPR Right 10/8/2018    Procedure: ARTHROSCOPY SHOULDER WITH SUBACROMIAL DECOMPRESSION AND LABRAR REPAIR, BICEP TENOLYSIS;  Surgeon: Apolonia Travis MD;  Location: [facility] OR;  Service: Orthopedics    WISDOM TOOTH EXTRACTION         PE:   Vitals:    05/05/21 1252   BP: 143/88   BP Location: Right arm   Pulse: 82   Resp: 20   SpO2: 99%   Weight: 68 kg (150 lb)   Height: 5' 6" (1 676 m)     General: VSS, NAD, awake, alert  Well-nourished, well-developed  Appears stated age  Speaking normally in full sentences  Head: Normocephalic, atraumatic, nontender  Eyes: PERRL, EOM-I  No diplopia  No hyphema  No subconjunctival hemorrhages  Symmetrical lids  ENT: Atraumatic external nose and ears  MMM  No malocclusion  No stridor  Normal phonation  No drooling  Normal swallowing  Neck: Symmetric, trachea midline  No JVD  CV: RRR  +S1/S2  No murmurs or gallops  Peripheral pulses +2 throughout  Exquisite TTP over the R anterior chest wall adjacent to RUSB    Lungs:   Unlabored No retractions  CTAB, lungs sounds equal bilateral    No tachypnea  Abd: +BS, soft, NT/ND    MSK:   FROM   Back:   No rashes  Skin: Dry, intact  Neuro: AAOx3, GCS 15, CN II-XII grossly intact  Motor grossly intact  Psychiatric/Behavioral: Appropriate mood and affect   Exam: deferred        A:  - R anterior chest wall pain likely costochondritis  P:  - R rib series  - multimodal analgesia  - 13 point ROS was performed and all are normal unless stated in the history above  - Nursing note reviewed  Vitals reviewed  - Orders placed by myself and/or advanced practitioner / resident     - Previous chart was reviewed  - No language barrier    - History obtained from patient  - There are no limitations to the history obtained  Medications   lidocaine (LIDODERM) 5 % patch 1 patch (1 patch Topical Medication Applied 5/5/21 1423)   HYDROmorphone (DILAUDID) injection 1 mg (1 mg Subcutaneous Given 5/5/21 1424)   ketorolac (TORADOL) injection 15 mg (15 mg Intramuscular Given 5/5/21 1424)     XR ribs with pa chest min 3 views RIGHT   ED Interpretation   NO FX OR PTX        Orders Placed This Encounter   Procedures    XR ribs with pa chest min 3 views RIGHT     Labs Reviewed - No data to display  Time reflects when diagnosis was documented in both MDM as applicable and the Disposition within this note     Time User Action Codes Description Comment    5/5/2021  2:53 PM Leticia Bosch Spruce [R07 89] Right-sided chest wall pain     5/5/2021  2:53 PM Ale Bosch Add [M94 0] Costochondritis       ED Disposition     ED Disposition Condition Date/Time Comment    Discharge Stable Wed May 5, 2021  2:53 PM Robinson Human discharge to home/self care  Follow-up Information     Follow up With Specialties Details Why [place]    [address] [facility]          Discharge Medication List as of 5/5/2021  2:55 PM      START taking these medications    Details   oxyCODONE-acetaminophen (PERCOCET) 5-325 mg per tablet Take 1 tablet by mouth every 6 (six) hours as needed for moderate pain or severe painMax Daily Amount: 4 tablets, Starting Wed 5/5/2021, Normal         CONTINUE these medications which have NOT CHANGED    Details   albuterol (PROAIR HFA) 90 mcg/act inhaler Inhale as needed  , Historical Med           No discharge procedures on file  Prior to Admission Medications   Prescriptions Last Dose Informant Patient Reported? Taking? albuterol (PROAIR HFA) 90 mcg/act inhaler  Self Yes No   Sig: Inhale as needed        Facility-Administered Medications: None       Portions of the record may have been created with voice recognition software  Occasional wrong word or "sound a like" substitutions may have occurred due to the inherent limitations of voice recognition software  Read the chart carefully and recognize, using context, where substitutions have occurred      Electronically signed by:  Kori Vanegas [address] [place] [address] DO [place]  05/05/21 5151

## 2021-06-09 NOTE — ASSESSMENT & PLAN NOTE
Findings and treatment options were discussed with the patient  He is doing well  Recommend formal physical therapy at this time  Continue work restrictions  No lifting greater than 20 lb and no overhead activities  Follow-up in 4 weeks with Dr Pasquale Crum for re-evaluation  Your results are normal.  Anya Nowak MD

## (undated) DEVICE — STANDARD SURGICAL GOWN, L: Brand: CONVERTORS

## (undated) DEVICE — KIT DISP F/ 2.9MM ROTATOR CUFF PUSHLOCK

## (undated) DEVICE — SUT FIBERWIRE #2 1/2 CIRCLE T-5 38IN AR-7200

## (undated) DEVICE — CLEAR-TRAC THREADED CANNULA WITH                                    OBTURATOR 5 MM X 76 MM, LATEX FREE,                                    BOX OF 10: Brand: CLEAR-TRAC

## (undated) DEVICE — SYRINGE 3ML LL

## (undated) DEVICE — CANNULA 7 X70MM THRD SEAL SIDE PORT

## (undated) DEVICE — GLOVE SRG BIOGEL 7

## (undated) DEVICE — ARTHROSCOPY FLOOR MAT

## (undated) DEVICE — BURR  OVAL 4MM 13CM 8 FLUTE COOLCUT

## (undated) DEVICE — ARM SLING: Brand: DEROYAL

## (undated) DEVICE — VAPR COOLPULSE 90 ELECTRODE 90 DEGREES SUCTION WITH INTEGRATED HANDPIECE: Brand: VAPR COOLPULSE

## (undated) DEVICE — SLEEVE SUSPENSION SHOULDER STAR LAT TRAC VELCRO STRAP

## (undated) DEVICE — GLOVE SRG BIOGEL 7.5

## (undated) DEVICE — GLOVE INDICATOR PI UNDERGLOVE SZ 7 BLUE

## (undated) DEVICE — PACK MAJOR ORTHO W/SPLITS PBDS

## (undated) DEVICE — CHLORAPREP HI-LITE 26ML ORANGE

## (undated) DEVICE — INTENDED FOR TISSUE SEPARATION, AND OTHER PROCEDURES THAT REQUIRE A SHARP SURGICAL BLADE TO PUNCTURE OR CUT.: Brand: BARD-PARKER SAFETY BLADES SIZE 11, STERILE

## (undated) DEVICE — PUDDLE VAC

## (undated) DEVICE — GLOVE SRG BIOGEL ORTHOPEDIC 7.5

## (undated) DEVICE — TUBING ARTHROSCOPIC WAVE  MAIN PUMP

## (undated) DEVICE — NEEDLE SPINAL18G X 3.5 IN QUINCKE

## (undated) DEVICE — FILTER STRAW 1.7

## (undated) DEVICE — Device

## (undated) DEVICE — GLOVE INDICATOR PI UNDERGLOVE SZ 8 BLUE

## (undated) DEVICE — NEEDLE 18 G X 1 1/2

## (undated) DEVICE — BLADE SHAVER EXCALIBUR 4MM 13CM COOLCUT

## (undated) DEVICE — SUT ETHILON 3-0 PS-1 18 IN 1663H

## (undated) DEVICE — TUBING SUCTION 5MM X 12 FT